# Patient Record
Sex: FEMALE | Race: WHITE
[De-identification: names, ages, dates, MRNs, and addresses within clinical notes are randomized per-mention and may not be internally consistent; named-entity substitution may affect disease eponyms.]

---

## 2016-04-29 VITALS — SYSTOLIC BLOOD PRESSURE: 138 MMHG | DIASTOLIC BLOOD PRESSURE: 89 MMHG

## 2019-09-10 ENCOUNTER — HOSPITAL ENCOUNTER (OUTPATIENT)
Dept: HOSPITAL 61 - KCIC MRI | Age: 64
Discharge: HOME | End: 2019-09-10
Payer: MEDICARE

## 2019-09-10 DIAGNOSIS — M19.011: Primary | ICD-10-CM

## 2019-09-10 PROCEDURE — 73221 MRI JOINT UPR EXTREM W/O DYE: CPT

## 2019-09-11 NOTE — [facility]
EXAM: MRI right shoulder

 

DATE: 9/10/2019 1:15 PM

 

COMPARISON: None

 

INDICATION: Progressing right shoulder pain for one year 

 

TECHNIQUE: Multiplanar, multisequence MRI of the right shoulder was 

performed without contrast.

 

FINDINGS:

AC joint degenerative changes are seen with small inferior projecting 

osteophytes. Type I acromion.

 

Subacromial-subdeltoid bursal distention likely bursitis.

 

There is marked thickening of the central portion of the the supraspinous 

tendon with marked increased signal, just below the threshold for a fluid 

signal. The bursal and articular fibers are visualized and therefore tear 

tear of the supraspinatus is less likely. However tendinosis or contusion 

have similar appearance. In addition, hydroxyapatite deposition disease 

may also have similar characteristics. This can be correlated with 

radiographs and patient's symptoms. Intrasubstance ganglion within the 

infraspinatus extends to the myotendinous junction. Mild subscapularis 

tendinosis.

 

Mild thickening of the intra-articular segment long head biceps tendon 

likely tendinosis. Fissuring of the extra articular long head biceps 

tendon with fluid signal.

 

No discrete labral tear is seen. 

 

Survey evaluation of the articular cartilage, grossly preserved. No 

evidence for fracture or osteonecrosis.

 

IMPRESSION: 

1.  Morphologic and signal changes within the central portion of the 

supraspinatus at the attachment with apparently intact articular and 

bursal fibers. Given these findings contusion or tendinosis is suspected 

anterior is less likely. Additionally intrinsic tendinous process such as 

calcific tendinitis/hydroxyapatite deposition disease may result in 

similar appearance and can be correlated with patient's symptoms and 

radiographs.

2.  Intrasubstance ganglion of the infraspinatus extends to the 

myotendinous junction.

3.  Intra-articular long head biceps tendinosis. Extra articular long head

biceps partial fissuring/incomplete longitudinal tear.

 

Electronically signed by: German Hung MD (9/11/2019 9:22 AM) [facility]

## 2019-10-02 ENCOUNTER — HOSPITAL ENCOUNTER (OUTPATIENT)
Dept: HOSPITAL 63 - DXRAD | Age: 64
Discharge: HOME | End: 2019-10-02
Attending: FAMILY MEDICINE
Payer: MEDICARE
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
## 2019-10-02 DIAGNOSIS — S09.8XXA: Primary | ICD-10-CM
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
## 2019-10-02 PROCEDURE — 70150 X-RAY EXAM OF FACIAL BONES: CPT

## 2019-10-02 PROCEDURE — 70250 X-RAY EXAM OF SKULL: CPT

## 2019-10-02 NOTE — RAD
EXAM: Skull, 2 views; maximal facial bones, 3 views.

 

HISTORY: Trauma.

 

COMPARISON: None.

 

FINDINGS: 2 views of the skull and 3 views of the maximal facial bones are

obtained. No displaced fracture is seen. There is no significant nasal 

septal deviation. There is no paranasal sinus air-fluid level or 

opacification. The frontal sinuses are underpneumatized, a normal variant.

There is dental amalgam and there are multiple missing teeth.

 

IMPRESSION: No acute osseous finding.

 

Electronically signed by: Yeni Lora MD (10/2/2019 2:54 PM) [place] [id number]

## 2019-10-02 NOTE — RAD
EXAM: Skull, 2 views; maximal facial bones, 3 views.

 

HISTORY: Trauma.

 

COMPARISON: None.

 

FINDINGS: 2 views of the skull and 3 views of the maximal facial bones are

obtained. No displaced fracture is seen. There is no significant nasal 

septal deviation. There is no paranasal sinus air-fluid level or 

opacification. The frontal sinuses are underpneumatized, a normal variant.

There is dental amalgam and there are multiple missing teeth.

 

IMPRESSION: No acute osseous finding.

 

Electronically signed by: Yeni Lora MD (10/2/2019 2:54 PM) [place] [id number]

## 2020-07-20 ENCOUNTER — HOSPITAL ENCOUNTER (OUTPATIENT)
Dept: HOSPITAL 63 - DXRAD | Age: 65
Discharge: HOME | End: 2020-07-20
Attending: FAMILY MEDICINE
Payer: MEDICARE

## 2020-07-20 DIAGNOSIS — I51.7: ICD-10-CM

## 2020-07-20 DIAGNOSIS — R04.2: Primary | ICD-10-CM

## 2020-07-20 DIAGNOSIS — J18.9: ICD-10-CM

## 2020-07-20 PROCEDURE — 71046 X-RAY EXAM CHEST 2 VIEWS: CPT

## 2020-07-20 NOTE — RAD
PA and lateral views of the chest. 

 

Comparison:  None.

 

Indication:  Hemoptysis

 

Findings: 

 

The heart size is enlarged. No pneumothorax or effusion. There is focal 

airspace disease in the posterior aspect of the right upper lobe. The bony

structures are intact.

 

Impression: 

1. Posterior segment right upper lobe airspace disease likely blood given 

history of hemoptysis. Aspiration and pneumonia are also considerations.

 

 

Electronically signed by: Armando Garcia MD (7/20/2020 12:00 PM) [id number]

## 2020-07-23 ENCOUNTER — HOSPITAL ENCOUNTER (OUTPATIENT)
Dept: HOSPITAL 63 - LAB | Age: 65
Discharge: HOME | End: 2020-07-23
Attending: FAMILY MEDICINE
Payer: MEDICARE

## 2020-07-23 DIAGNOSIS — R04.2: ICD-10-CM

## 2020-07-23 DIAGNOSIS — J18.9: ICD-10-CM

## 2020-07-23 DIAGNOSIS — Z11.59: Primary | ICD-10-CM

## 2020-07-23 LAB
ALBUMIN SERPL-MCNC: 3.1 G/DL (ref 3.4–5)
ALBUMIN/GLOB SERPL: 0.7 {RATIO} (ref 1–1.7)
ALP SERPL-CCNC: 105 U/L (ref 46–116)
ALT SERPL-CCNC: 19 U/L (ref 14–59)
ANION GAP SERPL CALC-SCNC: 11 MMOL/L (ref 6–14)
AST SERPL-CCNC: 12 U/L (ref 15–37)
BASOPHILS # BLD AUTO: 0 X10^3/UL (ref 0–0.2)
BASOPHILS NFR BLD: 1 % (ref 0–3)
BILIRUB SERPL-MCNC: 0.2 MG/DL (ref 0.2–1)
BUN/CREAT SERPL: 16 (ref 6–20)
CA-I SERPL ISE-MCNC: 23 MG/DL (ref 7–20)
CALCIUM SERPL-MCNC: 9.6 MG/DL (ref 8.5–10.1)
CHLORIDE SERPL-SCNC: 99 MMOL/L (ref 98–107)
CO2 SERPL-SCNC: 26 MMOL/L (ref 21–32)
CREAT SERPL-MCNC: 1.4 MG/DL (ref 0.6–1)
EOSINOPHIL NFR BLD: 0.1 X10^3/UL (ref 0–0.7)
EOSINOPHIL NFR BLD: 1 % (ref 0–3)
ERYTHROCYTE [DISTWIDTH] IN BLOOD BY AUTOMATED COUNT: 13.8 % (ref 11.5–14.5)
GFR SERPLBLD BASED ON 1.73 SQ M-ARVRAT: 37.9 ML/MIN
GLOBULIN SER-MCNC: 4.6 G/DL (ref 2.2–3.8)
GLUCOSE SERPL-MCNC: 196 MG/DL (ref 70–99)
HCT VFR BLD CALC: 33 % (ref 36–47)
HGB BLD-MCNC: 11 G/DL (ref 12–15.5)
LYMPHOCYTES # BLD: 1.7 X10^3/UL (ref 1–4.8)
LYMPHOCYTES NFR BLD AUTO: 16 % (ref 24–48)
MCH RBC QN AUTO: 31 PG (ref 25–35)
MCHC RBC AUTO-ENTMCNC: 34 G/DL (ref 31–37)
MCV RBC AUTO: 92 FL (ref 79–100)
MONO #: 0.9 X10^3/UL (ref 0–1.1)
MONOCYTES NFR BLD: 8 % (ref 0–9)
NEUT #: 7.8 X10^3UL (ref 1.8–7.7)
NEUTROPHILS NFR BLD AUTO: 75 % (ref 31–73)
PLATELET # BLD AUTO: 384 X10^3/UL (ref 140–400)
POTASSIUM SERPL-SCNC: 4.4 MMOL/L (ref 3.5–5.1)
PROT SERPL-MCNC: 7.7 G/DL (ref 6.4–8.2)
RBC # BLD AUTO: 3.58 X10^6/UL (ref 3.5–5.4)
SODIUM SERPL-SCNC: 136 MMOL/L (ref 136–145)
WBC # BLD AUTO: 10.5 X10^3/UL (ref 4–11)

## 2020-07-23 PROCEDURE — 36415 COLL VENOUS BLD VENIPUNCTURE: CPT

## 2020-07-23 PROCEDURE — 85025 COMPLETE CBC W/AUTO DIFF WBC: CPT

## 2020-07-23 PROCEDURE — 71046 X-RAY EXAM CHEST 2 VIEWS: CPT

## 2020-07-23 PROCEDURE — 80053 COMPREHEN METABOLIC PANEL: CPT

## 2020-07-23 NOTE — RAD
CHEST PA   LATERAL

 

History: Reason: PNEUMONIA, HEMOPYTSIS / Spl. Instructions:  / History:  

 

Comparison: 7/20/2020.

 

Findings: 

Frontal and lateral views of the chest were obtained. The 

cardiomediastinal silhouette is normal. Pulmonary vasculature is normal. 

Right upper lobe consolidation along the major fissure is present. This is

similar to the prior exam. No pleural effusion or pneumothorax is seen. 

There is no acute bone abnormality. 

 

IMPRESSION: 

Right upper lobe consolidation along the major fissure again seen. No 

significant change. Follow-up to resolution recommended. No new process.

 

 

Electronically signed by: Adolfo Hardy MD (7/23/2020 3:06 PM) [facility]

## 2020-07-28 ENCOUNTER — HOSPITAL ENCOUNTER (INPATIENT)
Dept: HOSPITAL 61 - ER | Age: 65
LOS: 3 days | Discharge: HOME | DRG: 177 | End: 2020-07-31
Attending: INTERNAL MEDICINE | Admitting: INTERNAL MEDICINE
Payer: MEDICARE

## 2020-07-28 VITALS — BODY MASS INDEX: 39.22 KG/M2 | WEIGHT: 244.05 LBS | HEIGHT: 66 IN

## 2020-07-28 DIAGNOSIS — E66.01: ICD-10-CM

## 2020-07-28 DIAGNOSIS — Z88.2: ICD-10-CM

## 2020-07-28 DIAGNOSIS — E11.9: ICD-10-CM

## 2020-07-28 DIAGNOSIS — I10: ICD-10-CM

## 2020-07-28 DIAGNOSIS — E78.5: ICD-10-CM

## 2020-07-28 DIAGNOSIS — Z82.49: ICD-10-CM

## 2020-07-28 DIAGNOSIS — Z20.828: ICD-10-CM

## 2020-07-28 DIAGNOSIS — J90: ICD-10-CM

## 2020-07-28 DIAGNOSIS — R04.2: ICD-10-CM

## 2020-07-28 DIAGNOSIS — Z96.649: ICD-10-CM

## 2020-07-28 DIAGNOSIS — F17.200: ICD-10-CM

## 2020-07-28 DIAGNOSIS — J96.01: ICD-10-CM

## 2020-07-28 DIAGNOSIS — M19.90: ICD-10-CM

## 2020-07-28 DIAGNOSIS — Z88.8: ICD-10-CM

## 2020-07-28 DIAGNOSIS — J44.0: ICD-10-CM

## 2020-07-28 DIAGNOSIS — F41.9: ICD-10-CM

## 2020-07-28 DIAGNOSIS — J69.0: Primary | ICD-10-CM

## 2020-07-28 LAB
ALBUMIN SERPL-MCNC: 2.7 G/DL (ref 3.4–5)
ALBUMIN/GLOB SERPL: 0.6 {RATIO} (ref 1–1.7)
ALP SERPL-CCNC: 100 U/L (ref 46–116)
ALT SERPL-CCNC: 25 U/L (ref 14–59)
ANION GAP SERPL CALC-SCNC: 8 MMOL/L (ref 6–14)
AST SERPL-CCNC: 16 U/L (ref 15–37)
BASOPHILS # BLD AUTO: 0.1 X10^3/UL (ref 0–0.2)
BASOPHILS NFR BLD: 1 % (ref 0–3)
BILIRUB SERPL-MCNC: 0.1 MG/DL (ref 0.2–1)
BUN SERPL-MCNC: 16 MG/DL (ref 7–20)
BUN/CREAT SERPL: 15 (ref 6–20)
CALCIUM SERPL-MCNC: 9.3 MG/DL (ref 8.5–10.1)
CHLORIDE SERPL-SCNC: 103 MMOL/L (ref 98–107)
CO2 SERPL-SCNC: 28 MMOL/L (ref 21–32)
CREAT SERPL-MCNC: 1.1 MG/DL (ref 0.6–1)
EOSINOPHIL NFR BLD: 0.1 X10^3/UL (ref 0–0.7)
EOSINOPHIL NFR BLD: 1 % (ref 0–3)
ERYTHROCYTE [DISTWIDTH] IN BLOOD BY AUTOMATED COUNT: 13.9 % (ref 11.5–14.5)
GFR SERPLBLD BASED ON 1.73 SQ M-ARVRAT: 50 ML/MIN
GLOBULIN SER-MCNC: 4.4 G/DL (ref 2.2–3.8)
GLUCOSE SERPL-MCNC: 128 MG/DL (ref 70–99)
HCT VFR BLD CALC: 29.9 % (ref 36–47)
HGB BLD-MCNC: 10.5 G/DL (ref 12–15.5)
LYMPHOCYTES # BLD: 2.2 X10^3/UL (ref 1–4.8)
LYMPHOCYTES NFR BLD AUTO: 21 % (ref 24–48)
MAGNESIUM SERPL-MCNC: 1.7 MG/DL (ref 1.8–2.4)
MCH RBC QN AUTO: 32 PG (ref 25–35)
MCHC RBC AUTO-ENTMCNC: 35 G/DL (ref 31–37)
MCV RBC AUTO: 90 FL (ref 79–100)
MONO #: 0.8 X10^3/UL (ref 0–1.1)
MONOCYTES NFR BLD: 8 % (ref 0–9)
NEUT #: 7.1 X10^3/UL (ref 1.8–7.7)
NEUTROPHILS NFR BLD AUTO: 69 % (ref 31–73)
PLATELET # BLD AUTO: 462 X10^3/UL (ref 140–400)
POTASSIUM SERPL-SCNC: 4.6 MMOL/L (ref 3.5–5.1)
PROT SERPL-MCNC: 7.1 G/DL (ref 6.4–8.2)
PROTHROMBIN TIME: 13.2 SEC (ref 11.7–14)
RBC # BLD AUTO: 3.33 X10^6/UL (ref 3.5–5.4)
SODIUM SERPL-SCNC: 139 MMOL/L (ref 136–145)
WBC # BLD AUTO: 10.3 X10^3/UL (ref 4–11)

## 2020-07-28 PROCEDURE — 71275 CT ANGIOGRAPHY CHEST: CPT

## 2020-07-28 PROCEDURE — 93005 ELECTROCARDIOGRAM TRACING: CPT

## 2020-07-28 PROCEDURE — 94640 AIRWAY INHALATION TREATMENT: CPT

## 2020-07-28 PROCEDURE — 88305 TISSUE EXAM BY PATHOLOGIST: CPT

## 2020-07-28 PROCEDURE — 88112 CYTOPATH CELL ENHANCE TECH: CPT

## 2020-07-28 PROCEDURE — 80053 COMPREHEN METABOLIC PANEL: CPT

## 2020-07-28 PROCEDURE — G0378 HOSPITAL OBSERVATION PER HR: HCPCS

## 2020-07-28 PROCEDURE — 31622 DX BRONCHOSCOPE/WASH: CPT

## 2020-07-28 PROCEDURE — 71250 CT THORAX DX C-: CPT

## 2020-07-28 PROCEDURE — 88104 CYTOPATH FL NONGYN SMEARS: CPT

## 2020-07-28 PROCEDURE — 85025 COMPLETE CBC W/AUTO DIFF WBC: CPT

## 2020-07-28 PROCEDURE — 83735 ASSAY OF MAGNESIUM: CPT

## 2020-07-28 PROCEDURE — 84484 ASSAY OF TROPONIN QUANT: CPT

## 2020-07-28 PROCEDURE — 85610 PROTHROMBIN TIME: CPT

## 2020-07-28 PROCEDURE — 96360 HYDRATION IV INFUSION INIT: CPT

## 2020-07-28 PROCEDURE — 83880 ASSAY OF NATRIURETIC PEPTIDE: CPT

## 2020-07-28 PROCEDURE — 99285 EMERGENCY DEPT VISIT HI MDM: CPT

## 2020-07-28 PROCEDURE — 74176 CT ABD & PELVIS W/O CONTRAST: CPT

## 2020-07-28 PROCEDURE — 36415 COLL VENOUS BLD VENIPUNCTURE: CPT

## 2020-07-28 RX ADMIN — BACITRACIN SCH MLS/HR: 5000 INJECTION, POWDER, FOR SOLUTION INTRAMUSCULAR at 15:00

## 2020-07-28 NOTE — PHYS DOC
Past Medical History


Smoking Status:  Current Every Day Smoker





General Adult


EDM:


Chief Complaint:  SHORTNESS OF BREATH





HPI:


HPI:





Patient is a 65-year-old female who presents with a three-week history of 

hemoptysis.  She states she has been coughing and every time she coughs 

something up there is a fair amount of blood in the sputum.  She denies any 

fever chills or sweats.  She does feel achy.  She states she is only smoked 1 

cigarette in the last 5 or 6 days.  Ordinarily, she is a heavy smoker.  Patient 

denies any chest pain.  She denies any unintended weight loss.  []





Review of Systems:


Review of Systems:


Constitutional:   Denies fever or chills. []


Eyes:   Denies change in visual acuity. []


HENT:   Denies nasal congestion or sore throat. [] 


Respiratory:   Per HPI [] 


Cardiovascular:   Denies chest pain or edema. [] 


GI:   Denies abdominal pain, nausea, vomiting, bloody stools or diarrhea. [] 


:  Denies dysuria. [] 


Musculoskeletal:   Denies back pain or joint pain. [] 


Integument:   Denies rash. [] 


Neurologic:   Denies headache, focal weakness or sensory changes. [] 


Endocrine:   Denies polyuria or polydipsia. [] 


Lymphatic:  Denies swollen glands. [] 


Psychiatric:  Denies depression or anxiety. []





Heart Score:


Risk Factors:


Risk Factors:  DM, Current or recent (<one month) smoker, HTN, HLP, family 

history of CAD, obesity.


Risk Scores:


Score 0 - 3:  2.5% MACE over next 6 weeks - Discharge Home


Score 4 - 6:  20.3% MACE over next 6 weeks - Admit for Clinical Observation


Score 7 - 10:  72.7% MACE over next 6 weeks - Early Invasive Strategies





Allergies:


Allergies:





Allergies








Coded Allergies Type Severity Reaction Last Updated Verified


 


  nickel Allergy Intermediate METAL (RASH) 4/26/16 No


 


  tramadol Allergy Intermediate  4/26/16 Yes


 


  sumatriptan Adverse Reaction Severe TACHYCARDIA 4/26/16 Yes











Physical Exam:


PE:





Constitutional: Well developed, well nourished, no acute distress, non-toxic 

appearance. []


HENT: Normocephalic, atraumatic, bilateral external ears normal, oropharynx 

moist, no oral exudates, nose normal. []


Eyes: PERRLA, EOMI, conjunctiva normal, no discharge. [] 


Neck: Normal range of motion, no tenderness, supple, no stridor. [] 


Cardiovascular:Heart rate regular rhythm, no murmur []


Lungs & Thorax:  Bilateral breath sounds clear to auscultation []


Abdomen: Bowel sounds normal, soft, no tenderness, no masses, no pulsatile 

masses. [] 


Skin: Warm, dry, no erythema, no rash. [] 


Back: No tenderness, no CVA tenderness. [] 


Extremities: No tenderness, no cyanosis, no clubbing, ROM intact, no edema. [] 


Neurologic: Alert and oriented X 3, normal motor function, normal sensory 

function, no focal deficits noted. []


Psychologic: Affect normal, judgement normal, mood normal. []





EKG:


EKG:


[]





Radiology/Procedures:


Radiology/Procedures:


[]REASON: hemoptysis


PROCEDURE: CT ANGIOGRAPHY CHEST





Exam: CT of chest with contrast


 


INDICATION: Hemoptysis


 


TECHNIQUE: Sequential axial images through the chest obtained following 


the administration of 90 mL of Isovue-370 IV contrast. Sagittal and 


coronal reformatted images were reconstructed from the axial data and 


reviewed. 3-D reformatted images were reconstructed from the axial data 


and reviewed.


 


Comparisons: None


 


FINDINGS:


Visual is portions of the thyroid are unremarkable. No enlarged 


mediastinal lymph nodes.


 


Heart size is normal. No pericardial effusion. Thoracic aorta has a normal


course and caliber. Pulmonary artery is not enlarged. There is a large 


right suprahilar mass measuring approximately 9.3 x 6.4 cm which 


compresses the right upper lobe pulmonary artery. Mass extends into the 


subcarinal region.


 


Additionally this mass causes compression of the right upper lobe bronchus


and there is consolidative changes in the right upper lobe likely 


postobstructive in etiology. No pneumothorax.


 


There is a small right pleural effusion.


 


Visualized upper abdomen is unremarkable.


 


No suspicious osseous lesions or acute fractures.


 


IMPRESSION:


1.  No pulmonary embolus identified within the main, lobar or segmental 


pulmonary arteries.


2.  Large mass in the right hilar/subcarinal region measuring 


approximately 9.3 x 6.4 cm causing compression of the right upper lobe 


bronchus and pulmonary artery. Findings are favored represent primary lung


malignancy.


3.  Consolidative changes in the right upper lobe favored to represent 


postobstructive pneumonia.





Course & Med Decision Making:


Course & Med Decision Making


Pertinent Labs and Imaging studies reviewed. (See chart for details)





[ED course: Evaluation reveals a 65-year-old female who has been a longtime 

smoker.  She has had hemoptysis recently.  CT scan tonight showed a large lung 

mass that is likely a primary lung cancer.  Given her hemoptysis I felt that it 

was in the patient's best interest to be placed in the hospital in the work-up 

was started.]





Dragon Disclaimer:


Dragon Disclaimer:


This electronic medical record was generated, in whole or in part, using a voice

recognition dictation system.





Departure


Departure


Impression:  


   Primary Impression:  


   Cough with hemoptysis


   Additional Impression:  


   Lung mass


Disposition:  09 ADMITTED AS INPATIENT


Admitting Physician:  HIMS


Condition:  STABLE


Referrals:  


MICHELINE TELLEZ (PCP)





Justicifation of Admission Dx:


Justifications for Admission:


Justification of Admission Dx:  Yes


Comments:


Hemoptysis











ABIMAEL OG DO             Jul 28, 2020 20:34

## 2020-07-28 NOTE — RAD
Exam: CT of chest with contrast

 

INDICATION: Hemoptysis

 

TECHNIQUE: Sequential axial images through the chest obtained following 

the administration of 90 mL of Isovue-370 IV contrast. Sagittal and 

coronal reformatted images were reconstructed from the axial data and 

reviewed. 3-D reformatted images were reconstructed from the axial data 

and reviewed.

 

Comparisons: None

 

FINDINGS:

Visual is portions of the thyroid are unremarkable. No enlarged 

mediastinal lymph nodes.

 

Heart size is normal. No pericardial effusion. Thoracic aorta has a normal

course and caliber. Pulmonary artery is not enlarged. There is a large 

right suprahilar mass measuring approximately 9.3 x 6.4 cm which 

compresses the right upper lobe pulmonary artery. Mass extends into the 

subcarinal region.

 

Additionally this mass causes compression of the right upper lobe bronchus

and there is consolidative changes in the right upper lobe likely 

postobstructive in etiology. No pneumothorax.

 

There is a small right pleural effusion.

 

Visualized upper abdomen is unremarkable.

 

No suspicious osseous lesions or acute fractures.

 

IMPRESSION:

1.  No pulmonary embolus identified within the main, lobar or segmental 

pulmonary arteries.

2.  Large mass in the right hilar/subcarinal region measuring 

approximately 9.3 x 6.4 cm causing compression of the right upper lobe 

bronchus and pulmonary artery. Findings are favored represent primary lung

malignancy.

3.  Consolidative changes in the right upper lobe favored to represent 

postobstructive pneumonia.

 

 

Exposure: One or more of the following in the visualized dose reduction 

techniques were utilized for this examination:

1.  Automated exposure control

2.  Adjustment of the MA and/or KV according to patient size

3.  Use of iterative of reconstructive technique

 

Electronically signed by: Bonita Salazar MD (7/28/2020 10:32 PM) [id number]

## 2020-07-29 VITALS — DIASTOLIC BLOOD PRESSURE: 77 MMHG | SYSTOLIC BLOOD PRESSURE: 155 MMHG

## 2020-07-29 VITALS — DIASTOLIC BLOOD PRESSURE: 86 MMHG | SYSTOLIC BLOOD PRESSURE: 180 MMHG

## 2020-07-29 VITALS — DIASTOLIC BLOOD PRESSURE: 60 MMHG | SYSTOLIC BLOOD PRESSURE: 182 MMHG

## 2020-07-29 VITALS — SYSTOLIC BLOOD PRESSURE: 186 MMHG | DIASTOLIC BLOOD PRESSURE: 89 MMHG

## 2020-07-29 VITALS — DIASTOLIC BLOOD PRESSURE: 97 MMHG | SYSTOLIC BLOOD PRESSURE: 156 MMHG

## 2020-07-29 VITALS — SYSTOLIC BLOOD PRESSURE: 168 MMHG | DIASTOLIC BLOOD PRESSURE: 70 MMHG

## 2020-07-29 VITALS — SYSTOLIC BLOOD PRESSURE: 176 MMHG | DIASTOLIC BLOOD PRESSURE: 69 MMHG

## 2020-07-29 LAB
ALBUMIN SERPL-MCNC: 2.6 G/DL (ref 3.4–5)
ALBUMIN/GLOB SERPL: 0.6 {RATIO} (ref 1–1.7)
ALP SERPL-CCNC: 95 U/L (ref 46–116)
ALT SERPL-CCNC: 22 U/L (ref 14–59)
ANION GAP SERPL CALC-SCNC: 7 MMOL/L (ref 6–14)
AST SERPL-CCNC: 16 U/L (ref 15–37)
BASOPHILS # BLD AUTO: 0 X10^3/UL (ref 0–0.2)
BASOPHILS NFR BLD: 1 % (ref 0–3)
BILIRUB SERPL-MCNC: 0.2 MG/DL (ref 0.2–1)
BUN SERPL-MCNC: 13 MG/DL (ref 7–20)
BUN/CREAT SERPL: 14 (ref 6–20)
CALCIUM SERPL-MCNC: 9.1 MG/DL (ref 8.5–10.1)
CHLORIDE SERPL-SCNC: 103 MMOL/L (ref 98–107)
CO2 SERPL-SCNC: 29 MMOL/L (ref 21–32)
CREAT SERPL-MCNC: 0.9 MG/DL (ref 0.6–1)
EOSINOPHIL NFR BLD: 0.1 X10^3/UL (ref 0–0.7)
EOSINOPHIL NFR BLD: 1 % (ref 0–3)
ERYTHROCYTE [DISTWIDTH] IN BLOOD BY AUTOMATED COUNT: 13.8 % (ref 11.5–14.5)
GFR SERPLBLD BASED ON 1.73 SQ M-ARVRAT: 63 ML/MIN
GLOBULIN SER-MCNC: 4.3 G/DL (ref 2.2–3.8)
GLUCOSE SERPL-MCNC: 140 MG/DL (ref 70–99)
HCT VFR BLD CALC: 30.5 % (ref 36–47)
HGB BLD-MCNC: 10.5 G/DL (ref 12–15.5)
LYMPHOCYTES # BLD: 2 X10^3/UL (ref 1–4.8)
LYMPHOCYTES NFR BLD AUTO: 22 % (ref 24–48)
MCH RBC QN AUTO: 31 PG (ref 25–35)
MCHC RBC AUTO-ENTMCNC: 34 G/DL (ref 31–37)
MCV RBC AUTO: 90 FL (ref 79–100)
MONO #: 0.7 X10^3/UL (ref 0–1.1)
MONOCYTES NFR BLD: 8 % (ref 0–9)
NEUT #: 6 X10^3/UL (ref 1.8–7.7)
NEUTROPHILS NFR BLD AUTO: 68 % (ref 31–73)
PLATELET # BLD AUTO: 461 X10^3/UL (ref 140–400)
POTASSIUM SERPL-SCNC: 4.2 MMOL/L (ref 3.5–5.1)
PROT SERPL-MCNC: 6.9 G/DL (ref 6.4–8.2)
RBC # BLD AUTO: 3.38 X10^6/UL (ref 3.5–5.4)
SODIUM SERPL-SCNC: 139 MMOL/L (ref 136–145)
WBC # BLD AUTO: 8.8 X10^3/UL (ref 4–11)

## 2020-07-29 RX ADMIN — BACLOFEN SCH MG: 10 TABLET ORAL at 20:36

## 2020-07-29 RX ADMIN — BACITRACIN SCH MLS/HR: 5000 INJECTION, POWDER, FOR SOLUTION INTRAMUSCULAR at 00:51

## 2020-07-29 RX ADMIN — ASPIRIN SCH MG: 81 TABLET, COATED ORAL at 15:38

## 2020-07-29 RX ADMIN — FUROSEMIDE SCH MG: 20 TABLET ORAL at 15:39

## 2020-07-29 RX ADMIN — ATORVASTATIN CALCIUM SCH MG: 40 TABLET, FILM COATED ORAL at 20:36

## 2020-07-29 RX ADMIN — CEFTRIAXONE SCH GM: 1 INJECTION, POWDER, FOR SOLUTION INTRAMUSCULAR; INTRAVENOUS at 15:41

## 2020-07-29 RX ADMIN — BACITRACIN SCH MLS/HR: 5000 INJECTION, POWDER, FOR SOLUTION INTRAMUSCULAR at 05:07

## 2020-07-29 RX ADMIN — DIVALPROEX SODIUM SCH MG: 500 TABLET, DELAYED RELEASE ORAL at 20:36

## 2020-07-29 RX ADMIN — NORTRIPTYLINE HYDROCHLORIDE SCH MG: 25 CAPSULE ORAL at 20:36

## 2020-07-29 RX ADMIN — LOSARTAN POTASSIUM SCH MG: 25 TABLET, FILM COATED ORAL at 15:39

## 2020-07-29 RX ADMIN — BACLOFEN SCH MG: 10 TABLET ORAL at 15:38

## 2020-07-29 RX ADMIN — MULTIPLE VITAMINS W/ MINERALS TAB SCH TAB: TAB at 15:39

## 2020-07-29 NOTE — RAD
Exam: CT of abdomen and pelvis without contrast

 

INDICATION: Lung mass

 

TECHNIQUE: Sequential axial images through the abdomen and pelvis obtained

without IV contrast. Sagittal and coronal reformatted images were 

reconstructed from the axial data and reviewed.

 

Comparisons: None

 

FINDINGS:

Heart size is normal. No pericardial effusion. There is a small right 

pleural effusion. Visualized lung bases are clear.

 

Evaluation of solid organs limited secondary to noncontrast technique.

 

Liver, spleen, pancreas and adrenals are unremarkable. Gallbladder is 

nondistended. Contrast is seen within the gallbladder likely from 

vicarious excretion.

 

No perinephric inflammation or hydronephrosis. No renal or ureteral 

calculi are identified.

 

 Pelvis not well evaluated secondary to extensive metallic streak artifact

from bilateral hip arthroplasties.

 

Large and small bowel are unremarkable. Appendix is not identified. No 

free intra-abdominal air or fluid. No obstruction.

 

Abdominal aorta has a normal course and caliber.

 

No enlarged intra-abdominal lymph nodes are identified.

 

No suspicious osseous lesions or acute fractures.

 

IMPRESSION:

1.  No convincing evidence for malignancy identified within the abdomen or

pelvis on noncontrast exam.

2.  Small right pleural effusion.

 

 

Exposure: One or more of the following in the visualized dose reduction 

techniques were utilized for this examination:

1.  Automated exposure control

2.  Adjustment of the MA and/or KV according to patient size

3.  Use of iterative of reconstructive technique

 

Electronically signed by: Bonita Salazar MD (7/29/2020 4:23 PM) [id number]

## 2020-07-29 NOTE — EKG
[facility]

              [address] [place] [address]

Test Date:    2020               Test Time:    21:05:04

Pat Name:     MARY MOJICA              Department:   

Patient ID:   [id number]           Room:          

Gender:       F                        Technician:   

:          [id number]               Requested By: ABIMAEL OG

Order Number: [id number]           Reading MD:     

                                 Measurements

Intervals                              Axis          

Rate:         83                       P:            59

ME:           186                      QRS:          -14

QRSD:         98                       T:            39

QT:           372                                    

QTc:          443                                    

                           Interpretive Statements

SINUS RHYTHM

LEFTWARD AXIS

QRS(T) CONTOUR ABNORMALITY

CONSIDER ANTEROSEPTAL MYOCARDIAL DAMAGE

POSSIBLY ABNORMAL ECG

RI6.01

No previous ECG available for comparison

## 2020-07-29 NOTE — HP
ADMIT DATE:  



CHIEF COMPLAINT:  Hemoptysis and shortness of breath.



HISTORY OF PRESENT ILLNESS:  The patient is a pleasant 65-year-old female who

states she quit smoking 3 weeks ago, but then she had a cigarette on the way to

the hospital.  She came to the hospital because she has been coughing up blood,

it is a fair amount of blood, rated at 6/10.  She has associated anxiety.  I

discussed the case with ER physician.  We are going to admit the patient and

consult Pulmonary Medicine.  It should be noted that she also has a lung mass on

her chest x-ray done in the ER.  It is a large mass about 9.3 x 6.4 causing

compression of the right bronchus.  Findings are favoring primary lung cancer.



PAST MEDICAL HISTORY:  Heavy tobacco abuse, hypertension, hyperlipidemia,

angina, seizures, edema, diabetes.



ALLERGIES:  SUMATRIPTAN, ULTRAM, and NICKEL.



FAMILY HISTORY:  Coronary disease.



SOCIAL HISTORY:  She smoked until 3 weeks ago, although she smoked a cigar on

the way here, does not drink or take drugs.  She is retired.



MEDICATIONS:  Reviewed, please refer to the MRAD.



REVIEW OF SYSTEMS:

GENERAL:  No history of weight change, weakness or fevers.

SKIN:  No bruising, hair changes or rashes.

EYES:  No blurred, double or loss of vision.

NOSE AND THROAT:  No history of nosebleeds, hoarseness or sore throat.

HEART:  No history of palpitations, chest pain.

LUNGS:  She complains of shortness of breath and cough and hemoptysis..

GASTROINTESTINAL:  Denies changes in appetite, nausea, vomiting, diarrhea or

constipation.

GENITOURINARY:  No history of frequency, urgency, hesitancy or nocturia.

NEUROLOGIC:  Denies history of numbness, tingling, tremor or weakness.

PSYCHIATRIC:  No history of panic, anxiety or depression.

ENDOCRINE:  No history of heat or cold intolerance, polyuria or polydipsia.

EXTREMITIES:  Denies muscle weakness, joint pain, pain on walking or stiffness.

 

PHYSICAL EXAMINATION:

VITALS:  Within normal limits and are stable.

GENERAL:  No apparent distress.  Alert and oriented.

HEENT:  Normal cephalic atraumatic, external auditory canals are patent

EYES:  Extraocular muscles are intact, pupils are equally round and reactive to

light and accommodation

MUSCULOSKELETAL:  Well developed, well nourished, good range of motion

ENDOCRINE:  No thyromegaly was palpated.

LYMPHATICS:  No cervical chain or axillary nodes were noted

HEMATOPOIETIC:  No bruising

NECK:  Supple, no JVD, no thyromegaly was noted.

LUNGS:  Clear to auscultation in all lung fields without rhonchi or wheezing.

HEART:  RRR, S1, S2 present.  Peripheral pulses intact, no obvious murmurs were

noted.

ABDOMEN:  Soft, nontender.  Positive bowel sounds no organomegaly, normal bowel

sounds.

EXTREMITIES:  Without any cyanosis, clubbing, or edema.  Pedal pulses intact,

Homans sign is negative.

NEUROLOGIC:  Normal speech, normal tone.  A & O x3, moves all extremities, no

obvious focal deficits.

PSYCHIATRIC:  Normal affect, normal mood.  Stable.

SKIN:  No ulcerations or rashes, good skin turgor, no jaundice.

VASCULAR:  Good capillary refill, neurovascular bundle appears to be intact.



LABORATORY DATA:  White count 10, hemoglobin 10, platelets 462.  Electrolytes

are normal.  



CT of the chest shows a large pulmonary mass 9.3 x 6.4.



ASSESSMENT AND PLAN:  Probable lung cancer.  The patient has been admitted.  We

will consult Pulmonary Medicine.  Suspect we will need to get a biopsy to

confirm this.  Consult Hematology/Oncology, home meds, DVT prophylaxis, full

code, DuoNeb.



PROGNOSIS:  Extremely guarded.

 



______________________________

BENITO HARMON DO



DR:  SHELBY/megan  JOB#:  [id number] / [id number]

DD:  07/29/2020 09:05  DT:  07/29/2020 09:31

## 2020-07-29 NOTE — CONS
DATE OF CONSULTATION:  



PULMONARY CONSULTATION



ATTENDING PHYSICIAN:  Víctor Dyer MD.



REASON FOR CONSULTATION:  Hemoptysis, lung mass.



HISTORY OF PRESENT ILLNESS:  The patient is a 65-year-old obese female with a

BMI of 40.9.  The patient has been having hemoptysis for 3 weeks.  She saw her

primary care doctor who called me that despite antibiotic there is no

improvement with hemoptysis.  As a result, I advised him to come to the

Emergency Room and get a CAT scan of the chest.  The patient has smoked for

about 50 years.  She has not completely quit tobacco.  She has never been tested

for sleep apnea.  She has no history of deep vein thrombosis or pulmonary

embolism.  She denies any weight loss.



Imaging study was performed and CTA chest was reviewed by me.  The patient has a

large right suprahilar mass measuring about 9.3 cm in size.  There is

compression of the right upper lobe bronchus.  There is also compression of the

pulmonary artery on the right side.  There is postobstructive consolidation

versus a mass in the right upper lobe.  I have been asked to see her for further

evaluation.  No suspicious lesion or acute fractures were seen.  No abdominal

pathology reported.  She is currently requiring oxygen at 2 liters.



PAST MEDICAL HISTORY:  Significant for,

1.  History of tobacco use, suspected underlying COPD.

2.  History of morbid obesity and suspected DOT.

3.  She has hypertension, hyperlipidemia, seizures, and diabetes.



PAST SURGICAL HISTORY:  No recent surgeries.



FAMILY HISTORY:  Coronary artery disease.



ALLERGIES:  SUMATRIPTAN, ULTRAM, AND NICKEL.



SOCIAL HISTORY:  Smoked since age 17.  Smoked for at least 50 years.



MEDICATIONS:  Reviewed as listed in the MRAD.



REVIEW OF SYSTEMS:  Twelve-point system obtained.  Pertinent positives discussed

in my history of present illness, otherwise noncontributory.  All systems that

were negative were reviewed as well.



PHYSICAL EXAMINATION:

VITAL SIGNS:  Reviewed.  Blood pressure on the high side.  Pulse ox 97% on 2

liters, afebrile.

HEENT:  Sclerae nonicteric.

NECK:  Supple.

LUNGS:  With diminished breath sounds in right lung.

CARDIOVASCULAR:  With a regular rate.

ABDOMEN:  Soft, obese.

EXTREMITIES:  With no pitting edema.



LABORATORY DATA:  Reviewed.  White cell count 8.8, hemoglobin 10.5, and

platelets are 461.  INR 1.0.  BUN 13, creatinine 0.9.  Albumin is 2.6.



IMPRESSION:

1.  Acute hypoxic respiratory failure secondary to underlying chronic

obstructive pulmonary disease, obesity and highly suspected primary lung

malignancy.

2.  Hemoptysis secondary to suspected endobronchial lesion.

3.  Abnormal CT chest with a large suprahilar mass 9.3 cm in size causing

compression of the right upper lobe bronchus and also endobronchial lesion is

suspected.  She has a postobstructive consolidation in the right upper lobe,

which could be combination of blood and pneumonia.



RECOMMENDATIONS:

1.  Discussed with the patient and RN.  I explained to her the CT chest

findings.  She would be a candidate for bronchoscopy.  COVID testing is pending

and once she is ruled out, we will pursue with bronchoscopy.  She understands

the risk.  She also understands that post-biopsy, there could be bleeding, which

in a small percentage of cases, may result in respiratory failure requiring

mechanical ventilation.  She agrees to proceed with the procedure.

2.  We will do CT abdomen and pelvis to rule out any distant metastasis.

3.  Continue present oxygen.

4.  Add empiric antibiotic.

5.  Weight loss is advised.

6.  Medical-Oncology and Radiation-Oncology consultation.  Based on the CT

chest, she is not a surgical candidate.

7.  PFTs as an outpatient.

8.  Discussed with RN and we will follow along with you.

 



______________________________

BUBBA WRAY MD DR:  JA/megan  JOB#:  [id number] / [id number]

DD:  07/29/2020 11:27  DT:  07/29/2020 11:44

## 2020-07-29 NOTE — PDOC2
CONSULT


Date of Consult


Date of Consult


DATE: 7/29/20 


TIME: 16:47





Reason for Consult


Reason for Consult:


Lung mass





Referring Physician


Referring Physician:


Dr Garcia





Identification/Chief Complaint


Chief Complaint


Hemoptysis


Problems:  


(1) Lung mass


(2) Cough with hemoptysis





Source


Source:  Chart review, Patient





History of Present Illness


Reason for Visit:


Ms Melchor is a 65 year old female with hx of tobacco use who has been admitted 

for further evaluation and management of hemoptysis. Patient reprots that she 

has noticed bloody content in her sputum for several days. Following her 

admission to the hospital, she  received a CT chest which showed a large right 

hilar mass with post-obstructive pneumonia. She endorses shortness of breath. 

She denies fever, chills, or chest pain. She reports a hx of DM2 but has no hx 

of HTN, CAD or CVA. She has been seen by Dr Montero in consultation and 

bronchoscopy has been planned for sampling of the lung mass.





Oncology has been consulted to assist with work-up of lung mass.





Past Medical History


Cardiovascular:  HTN


Musculoskeletal:  Osteoarthritis


Endocrine:  Diabetes





Past Surgical History


Past Surgical History:  Total hip replacement





Family History


Family History:  No Significant





Social History


ALCOHOL:  none


Domestic Violence:  Neg





Current Problem List


Problem List


Problems


Medical Problems:


(1) Cough with hemoptysis


Status: Acute  





(2) Lung mass


Status: Acute  











Current Medications


Current Medications





Current Medications


Sodium Chloride 500 ml @  500 mls/hr 1X  ONCE IV  Last administered on 7/28/20at

21:07;  Start 7/28/20 at 20:30;  Stop 7/28/20 at 21:29;  Status DC


Iohexol (Omnipaque 350 Mg/ml) 100 ml [employer] ONCE .ROUTE ;  Start 7/28/20 at 

22:00;  Stop 7/28/20 at 22:00;  Status DC


Ondansetron HCl (Zofran) 4 mg PRN Q8HRS  PRN IV NAUSEA/VOMITING;  Start 7/28/20 

at 23:00;  Stop 7/29/20 at 22:59


Sodium Chloride 1,000 ml @  125 mls/hr Q8H IV  Last administered on 7/29/20at 

00:51;  Start 7/28/20 at 23:00;  Stop 7/29/20 at 22:59


Ceftriaxone Sodium (Rocephin) 1 gm Q24H IVP  Last administered on 7/29/20at 

15:41;  Start 7/29/20 at 13:00


Aspirin (Ecotrin) 81 mg DAILY PO  Last administered on 7/29/20at 15:38;  Start 

7/29/20 at 13:30


Atorvastatin Calcium (Lipitor) 40 mg HS PO ;  Start 7/29/20 at 21:00


Divalproex Sodium (Depakote) 1,000 mg HS PO ;  Start 7/29/20 at 21:00


Furosemide (Lasix) 20 mg DAILY PO  Last administered on 7/29/20at 15:39;  Start 

7/29/20 at 13:30


Losartan Potassium (Cozaar) 25 mg DAILY PO  Last administered on 7/29/20at 

15:39;  Start 7/29/20 at 13:30


Diltiazem HCl (Cardizem 24hr Cd) 180 mg BID PO ;  Start 7/29/20 at 21:00


Metformin HCl (Glucophage) 1,000 mg BIDWMEALS PO ;  Start 7/29/20 at 17:00


Multivitamins (Thera M Plus) 1 tab DAILY PO  Last administered on 7/29/20at 

15:39;  Start 7/29/20 at 13:30


Nortriptyline HCl (Pamelor) 100 mg QHS PO ;  Start 7/29/20 at 21:00


Baclofen (Lioresal) 20 mg TID PO  Last administered on 7/29/20at 15:38;  Start 

7/29/20 at 14:00





Active Scripts


Active


Reported


Augmentin 875-125 Tablet (Amoxicillin/Potassium Clav) 1 Each Tablet 1 Tab PO BID

4 Days


Baclofen 20 Mg Tablet 20 Mg PO TID


Metronidazole 500 Mg Tablet 1 Tab PO BID 7 Days


Aspir-Low (Aspirin) 81 Mg Tablet.dr 81 Mg PO DAILY


     LAST DOSE GIVEN:


     DATE: 4/29


     TIME: 9 am


     NEXT DOSE DUE:


     DATE: 4/30


     TIME: 9 am


Nitrostat (Nitroglycerin) 0.4 Mg Tab.subl 0.4 Mg SL  PRN


     Take as directed as needed for chest pain.


Losartan Potassium ** (Losartan Potassium) 25 Mg Tablet 25 Mg PO DAILY


     LAST DOSE GIVEN:


     DATE: 4/27


     TIME: 9 am


     NEXT DOSE DUE:


     DATE: 4/30


     TIME: 9 am


Atorvastatin Calcium 40 Mg Tablet 40 Mg PO HS


     LAST DOSE GIVEN:


     DATE: 4/28


     TIME: 9 pm


     NEXT DOSE DUE:


     DATE: 4/29


     TIME: 9 pm


Nortriptyline Hcl 50 Mg Capsule 100 Mg PO HS


     LAST DOSE GIVEN:


     DATE: 4/28


     TIME: 9 pm


     NEXT DOSE DUE:


     DATE: 4/29


     TIME: 9 pm


Depakote (Divalproex Sodium) 500 Mg Tablet.dr 1,000 Mg PO HS


     LAST DOSE GIVEN:


     DATE: 4/28


     TIME: 9 pm


     NEXT DOSE DUE:


     DATE: 4/29


     TIME: 9 pm


Metformin Hcl 1,000 Mg Tablet 1,000 Mg PO BID


     LAST DOSE GIVEN:


     DATE: 4/29


     TIME: 8 am


     NEXT DOSE DUE:


     DATE: 4/29


     TIME: 5 pm


Diltiazem Hcl Tablet (Diltiazem Hcl) 120 Mg Tablet 180 Mg PO BID


     LAST DOSE GIVEN:


     DATE: 4/29


     TIME: 9 am


     NEXT DOSE DUE:


     DATE: 4/29


     TIME: 9 am


Furosemide 20 Mg Tablet 20 Mg PO DAILY


     LAST DOSE GIVEN:


     DATE: 4/28


     TIME: 9 am


     NEXT DOSE DUE:


     DATE: 4/30


     TIME: 9 am


Multiple Vitamin (Multivitamin With Minerals) 1 Each Tablet 1 Each PO DAILY


     LAST DOSE GIVEN:


     DATE: 4/29


     TIME: 9 am


     NEXT DOSE DUE:


     DATE: 4/30


     TIME: 9 am





Allergies


Allergies:  


Coded Allergies:  


     nickel (Unverified  Allergy, Intermediate, METAL (RASH), 4/26/16)


     tramadol (Verified  Allergy, Intermediate, 4/26/16)


   


   MAKES HER FEEL "HIGH"


     sumatriptan (Verified  Adverse Reaction, Severe, TACHYCARDIA, 4/26/16)





ROS


General:  No: Chills, Night Sweats


PSYCHOLOGICAL ROS:  No: Anxiety


Eyes:  No Blurry vision, No Decreased vision


HEENT:  No: Heacaches, Visual Changes


ALLERGY AND IMMUNOLOGY:  No: Hives, Nasal Congestion


Hematological and Lymphatic:  No: Bleeding Problems, Blood Clots, Blood 

Transfusions


ENDOCRINE:  No: Breast Changes


Respiratory:  YES: Cough, Hemoptysis, Pleuritic Pain, Shortness of breath; 


   No: Orthopnea


Cardiovascular:  No Chest Pain, No Edema


Gastrointestinal:  No Nausea, No Vomiting


Genitourinary:  No Dysuria, No Flank Pain


Musculoskeletal:  No Gait Disturbance, No Joint Pain


Neurological:  No Behavorial Changes


Skin:  No Dry Skin





Physical Exam


General:  Alert, Oriented X3


HEENT:  Atraumatic


Lungs:  Clear to auscultation, Other (Crackles heard on the right side, 

decreased air entry in the lung bases)


Heart:  Regular rate, Normal S1


Abdomen:  Normal bowel sounds


Extremities:  No clubbing


Skin:  No rashes


Neuro:  Normal gait, Normal tone


MUSCULOSKELETAL:  No swelling





Vitals


VITALS





Vital Signs








  Date Time  Temp Pulse Resp B/P (MAP) Pulse Ox O2 Delivery O2 Flow Rate FiO2


 


7/29/20 15:39  94  182/60    


 


7/29/20 15:20 98.2  18  93 Nasal Cannula 2.0 





 98.2       











Labs


Labs





Laboratory Tests








Test


 7/28/20


20:50 7/29/20


04:45


 


White Blood Count


 10.3 x10^3/uL


(4.0-11.0) 8.8 x10^3/uL


(4.0-11.0)


 


Red Blood Count


 3.33 x10^6/uL


(3.50-5.40) 3.38 x10^6/uL


(3.50-5.40)


 


Hemoglobin


 10.5 g/dL


(12.0-15.5) 10.5 g/dL


(12.0-15.5)


 


Hematocrit


 29.9 %


(36.0-47.0) 30.5 %


(36.0-47.0)


 


Mean Corpuscular Volume 90 fL ()  90 fL () 


 


Mean Corpuscular Hemoglobin 32 pg (25-35)  31 pg (25-35) 


 


Mean Corpuscular Hemoglobin


Concent 35 g/dL


(31-37) 34 g/dL


(31-37)


 


Red Cell Distribution Width


 13.9 %


(11.5-14.5) 13.8 %


(11.5-14.5)


 


Platelet Count


 462 x10^3/uL


(140-400) 461 x10^3/uL


(140-400)


 


Neutrophils (%) (Auto) 69 % (31-73)  68 % (31-73) 


 


Lymphocytes (%) (Auto) 21 % (24-48)  22 % (24-48) 


 


Monocytes (%) (Auto) 8 % (0-9)  8 % (0-9) 


 


Eosinophils (%) (Auto) 1 % (0-3)  1 % (0-3) 


 


Basophils (%) (Auto) 1 % (0-3)  1 % (0-3) 


 


Neutrophils # (Auto)


 7.1 x10^3/uL


(1.8-7.7) 6.0 x10^3/uL


(1.8-7.7)


 


Lymphocytes # (Auto)


 2.2 x10^3/uL


(1.0-4.8) 2.0 x10^3/uL


(1.0-4.8)


 


Monocytes # (Auto)


 0.8 x10^3/uL


(0.0-1.1) 0.7 x10^3/uL


(0.0-1.1)


 


Eosinophils # (Auto)


 0.1 x10^3/uL


(0.0-0.7) 0.1 x10^3/uL


(0.0-0.7)


 


Basophils # (Auto)


 0.1 x10^3/uL


(0.0-0.2) 0.0 x10^3/uL


(0.0-0.2)


 


Prothrombin Time


 13.2 SEC


(11.7-14.0) 





 


Prothromb Time International


Ratio 1.0 (0.8-1.1) 


 





 


Sodium Level


 139 mmol/L


(136-145) 139 mmol/L


(136-145)


 


Potassium Level


 4.6 mmol/L


(3.5-5.1) 4.2 mmol/L


(3.5-5.1)


 


Chloride Level


 103 mmol/L


() 103 mmol/L


()


 


Carbon Dioxide Level


 28 mmol/L


(21-32) 29 mmol/L


(21-32)


 


Anion Gap 8 (6-14)  7 (6-14) 


 


Blood Urea Nitrogen


 16 mg/dL


(7-20) 13 mg/dL


(7-20)


 


Creatinine


 1.1 mg/dL


(0.6-1.0) 0.9 mg/dL


(0.6-1.0)


 


Estimated GFR


(Cockcroft-Gault) 50.0 


 63.0 





 


BUN/Creatinine Ratio 15 (6-20)  14 (6-20) 


 


Glucose Level


 128 mg/dL


(70-99) 140 mg/dL


(70-99)


 


Calcium Level


 9.3 mg/dL


(8.5-10.1) 9.1 mg/dL


(8.5-10.1)


 


Magnesium Level


 1.7 mg/dL


(1.8-2.4) 





 


Total Bilirubin


 0.1 mg/dL


(0.2-1.0) 0.2 mg/dL


(0.2-1.0)


 


Aspartate Amino Transf


(AST/SGOT) 16 U/L (15-37) 


 16 U/L (15-37) 





 


Alanine Aminotransferase


(ALT/SGPT) 25 U/L (14-59) 


 22 U/L (14-59) 





 


Alkaline Phosphatase


 100 U/L


() 95 U/L


()


 


Troponin I Quantitative


 < 0.017 ng/mL


(0.000-0.055) 





 


NT-Pro-B-Type Natriuretic


Peptide 216 pg/mL


(0-124) 





 


Total Protein


 7.1 g/dL


(6.4-8.2) 6.9 g/dL


(6.4-8.2)


 


Albumin


 2.7 g/dL


(3.4-5.0) 2.6 g/dL


(3.4-5.0)


 


Albumin/Globulin Ratio 0.6 (1.0-1.7)  0.6 (1.0-1.7) 








Laboratory Tests








Test


 7/28/20


20:50 7/29/20


04:45


 


White Blood Count


 10.3 x10^3/uL


(4.0-11.0) 8.8 x10^3/uL


(4.0-11.0)


 


Red Blood Count


 3.33 x10^6/uL


(3.50-5.40) 3.38 x10^6/uL


(3.50-5.40)


 


Hemoglobin


 10.5 g/dL


(12.0-15.5) 10.5 g/dL


(12.0-15.5)


 


Hematocrit


 29.9 %


(36.0-47.0) 30.5 %


(36.0-47.0)


 


Mean Corpuscular Volume 90 fL ()  90 fL () 


 


Mean Corpuscular Hemoglobin 32 pg (25-35)  31 pg (25-35) 


 


Mean Corpuscular Hemoglobin


Concent 35 g/dL


(31-37) 34 g/dL


(31-37)


 


Red Cell Distribution Width


 13.9 %


(11.5-14.5) 13.8 %


(11.5-14.5)


 


Platelet Count


 462 x10^3/uL


(140-400) 461 x10^3/uL


(140-400)


 


Neutrophils (%) (Auto) 69 % (31-73)  68 % (31-73) 


 


Lymphocytes (%) (Auto) 21 % (24-48)  22 % (24-48) 


 


Monocytes (%) (Auto) 8 % (0-9)  8 % (0-9) 


 


Eosinophils (%) (Auto) 1 % (0-3)  1 % (0-3) 


 


Basophils (%) (Auto) 1 % (0-3)  1 % (0-3) 


 


Neutrophils # (Auto)


 7.1 x10^3/uL


(1.8-7.7) 6.0 x10^3/uL


(1.8-7.7)


 


Lymphocytes # (Auto)


 2.2 x10^3/uL


(1.0-4.8) 2.0 x10^3/uL


(1.0-4.8)


 


Monocytes # (Auto)


 0.8 x10^3/uL


(0.0-1.1) 0.7 x10^3/uL


(0.0-1.1)


 


Eosinophils # (Auto)


 0.1 x10^3/uL


(0.0-0.7) 0.1 x10^3/uL


(0.0-0.7)


 


Basophils # (Auto)


 0.1 x10^3/uL


(0.0-0.2) 0.0 x10^3/uL


(0.0-0.2)


 


Prothrombin Time


 13.2 SEC


(11.7-14.0) 





 


Prothromb Time International


Ratio 1.0 (0.8-1.1) 


 





 


Sodium Level


 139 mmol/L


(136-145) 139 mmol/L


(136-145)


 


Potassium Level


 4.6 mmol/L


(3.5-5.1) 4.2 mmol/L


(3.5-5.1)


 


Chloride Level


 103 mmol/L


() 103 mmol/L


()


 


Carbon Dioxide Level


 28 mmol/L


(21-32) 29 mmol/L


(21-32)


 


Anion Gap 8 (6-14)  7 (6-14) 


 


Blood Urea Nitrogen


 16 mg/dL


(7-20) 13 mg/dL


(7-20)


 


Creatinine


 1.1 mg/dL


(0.6-1.0) 0.9 mg/dL


(0.6-1.0)


 


Estimated GFR


(Cockcroft-Gault) 50.0 


 63.0 





 


BUN/Creatinine Ratio 15 (6-20)  14 (6-20) 


 


Glucose Level


 128 mg/dL


(70-99) 140 mg/dL


(70-99)


 


Calcium Level


 9.3 mg/dL


(8.5-10.1) 9.1 mg/dL


(8.5-10.1)


 


Magnesium Level


 1.7 mg/dL


(1.8-2.4) 





 


Total Bilirubin


 0.1 mg/dL


(0.2-1.0) 0.2 mg/dL


(0.2-1.0)


 


Aspartate Amino Transf


(AST/SGOT) 16 U/L (15-37) 


 16 U/L (15-37) 





 


Alanine Aminotransferase


(ALT/SGPT) 25 U/L (14-59) 


 22 U/L (14-59) 





 


Alkaline Phosphatase


 100 U/L


() 95 U/L


()


 


Troponin I Quantitative


 < 0.017 ng/mL


(0.000-0.055) 





 


NT-Pro-B-Type Natriuretic


Peptide 216 pg/mL


(0-124) 





 


Total Protein


 7.1 g/dL


(6.4-8.2) 6.9 g/dL


(6.4-8.2)


 


Albumin


 2.7 g/dL


(3.4-5.0) 2.6 g/dL


(3.4-5.0)


 


Albumin/Globulin Ratio 0.6 (1.0-1.7)  0.6 (1.0-1.7) 











Images


Images


Reviewed CT chest results and personally reviewed images





Assessment/Plan


Assessment/Plan


Assessment:


Lung mass


Right pleural effusion


Hemoptysis


Tobacco use


Hypertension





Recommendations:


Agree with bronchoscopy for sampling of lung mass


Agree with CT abdomen for evaluation of distant metastasis


Recommend thoracentesis of right-sided pleural effusion, if feasible for staging

purposes


Unlikely to be surgically resectable based on appearance on CT


Plan PET and MRI brain inpatient/outpatient after histologic diagnosis is 

obtained to complete staging





Thank you for the consult





Marshal Romero MD


Hem-Onc


Ph: [phone]











THEODORA ROMERO MD       Jul 29, 2020 17:05

## 2020-07-29 NOTE — NUR
SW following. Spoke with RN and reviewed chart. SW attempted to call into pt's room to 
coordinate care. Pt on 2l 02 and IV Rocephin. Pt COVID pending. Pt is being followed by 
pulmonary and there is also a consult per her lung mass. SW to continue following.

## 2020-07-30 VITALS — SYSTOLIC BLOOD PRESSURE: 169 MMHG | DIASTOLIC BLOOD PRESSURE: 101 MMHG

## 2020-07-30 VITALS — DIASTOLIC BLOOD PRESSURE: 86 MMHG | SYSTOLIC BLOOD PRESSURE: 146 MMHG

## 2020-07-30 VITALS — SYSTOLIC BLOOD PRESSURE: 131 MMHG | DIASTOLIC BLOOD PRESSURE: 84 MMHG

## 2020-07-30 VITALS — DIASTOLIC BLOOD PRESSURE: 71 MMHG | SYSTOLIC BLOOD PRESSURE: 162 MMHG

## 2020-07-30 VITALS — SYSTOLIC BLOOD PRESSURE: 163 MMHG | DIASTOLIC BLOOD PRESSURE: 93 MMHG

## 2020-07-30 LAB — PROTHROMBIN TIME: 13.7 SEC (ref 11.7–14)

## 2020-07-30 PROCEDURE — 0BB58ZX EXCISION OF RIGHT MIDDLE LOBE BRONCHUS, VIA NATURAL OR ARTIFICIAL OPENING ENDOSCOPIC, DIAGNOSTIC: ICD-10-PCS | Performed by: INTERNAL MEDICINE

## 2020-07-30 RX ADMIN — FUROSEMIDE SCH MG: 20 TABLET ORAL at 08:25

## 2020-07-30 RX ADMIN — BACLOFEN SCH MG: 10 TABLET ORAL at 08:25

## 2020-07-30 RX ADMIN — BACITRACIN SCH MLS/HR: 5000 INJECTION, POWDER, FOR SOLUTION INTRAMUSCULAR at 12:30

## 2020-07-30 RX ADMIN — MULTIPLE VITAMINS W/ MINERALS TAB SCH TAB: TAB at 08:26

## 2020-07-30 RX ADMIN — BACLOFEN SCH MG: 10 TABLET ORAL at 14:19

## 2020-07-30 RX ADMIN — DIVALPROEX SODIUM SCH MG: 500 TABLET, DELAYED RELEASE ORAL at 21:25

## 2020-07-30 RX ADMIN — ATORVASTATIN CALCIUM SCH MG: 40 TABLET, FILM COATED ORAL at 21:24

## 2020-07-30 RX ADMIN — ASPIRIN SCH MG: 81 TABLET, COATED ORAL at 08:25

## 2020-07-30 RX ADMIN — NORTRIPTYLINE HYDROCHLORIDE SCH MG: 25 CAPSULE ORAL at 21:24

## 2020-07-30 RX ADMIN — SODIUM CHLORIDE, SODIUM LACTATE, POTASSIUM CHLORIDE, AND CALCIUM CHLORIDE SCH MLS/HR: .6; .31; .03; .02 INJECTION, SOLUTION INTRAVENOUS at 10:34

## 2020-07-30 RX ADMIN — BACLOFEN SCH MG: 10 TABLET ORAL at 21:24

## 2020-07-30 RX ADMIN — SODIUM CHLORIDE, SODIUM LACTATE, POTASSIUM CHLORIDE, AND CALCIUM CHLORIDE SCH MLS/HR: .6; .31; .03; .02 INJECTION, SOLUTION INTRAVENOUS at 21:23

## 2020-07-30 RX ADMIN — LOSARTAN POTASSIUM SCH MG: 25 TABLET, FILM COATED ORAL at 08:25

## 2020-07-30 RX ADMIN — CEFTRIAXONE SCH GM: 1 INJECTION, POWDER, FOR SOLUTION INTRAMUSCULAR; INTRAVENOUS at 14:02

## 2020-07-30 NOTE — OP
DATE OF SURGERY:  07/30/2020



PROCEDURE:  Bronchoscopy.



INDICATIONS:  Lung mass and hemoptysis.



DESCRIPTION OF PROCEDURE:  Informed consent was obtained from the patient.  All

risks and benefits were explained.  She agreed to proceed with the procedure. 

Risks including worsening bleeding and with respiratory failure.



Propofol was used by Anesthesia for sedation.



Bronchoscope was introduced through the left nostril.  The upper airway was

passed.  Vocal cord moves equally with respiration.  Trachea was entered.  No

tracheal lesions seen.  Gricelda was sharp.  Upon inspection of the right lung,

especially the right upper lobe, there was oozing of blood seen distally from

the apical subsegment of the right upper lobe.  I could not visualize any

obvious endobronchial lesion.  It could be distally present, but the scope was

unable to reach that area despite maximum angulation.  Blind cytology brush was

obtained from that area.  Bronch was then introduced at the junction of right

middle lobe and right lower lobe.  The subcarina was wide.  There was narrowing

of the opening of the right middle lobe and there was submucosal tumor. 

Biopsies x 3 were performed from this area and cytology brush was performed from

this area.  There was oozing of blood noticed, which was controlled with squirt

of epinephrine.  The right lower lobe appeared patent.  The left lung was

examined.  All subsegments of left upper lobe, lingula and left lower lobe were

examined.  No endobronchial lesions seen.  No mucosal abnormality seen.



IMPRESSION:

1.  Significant narrowing of the opening of the right middle lobe bronchus with

widening of the subcarina between right middle lobe and right lower lobe.  There

was mucosal tumor seen at the opening of the right middle lobe.  Biopsies x 3

along with cytology brush x 2 was performed from this area.

2.  Oozing of the blood noticed from the apical subsegment of the right upper

lobe very distally.  Despite maximum angulation with the bronchoscope, I could

not visualize any definite endobronchial lesion, which may be present distally. 

A blind cytology brush was obtained from this area.

3.  The patient tolerated the procedure well.  We will follow the results of the

biopsy.

 



______________________________

UBBBA WRAY MD



DR:  JA/megan  JOB#:  [id number] / [id number]

DD:  07/30/2020 11:23  DT:  07/30/2020 11:43

## 2020-07-30 NOTE — PDOC
[facility] PROGRESS NOTE


Chief Complaint


Chief Complaint


Acute hypoxic respiratory failure secondary to underlying COPD, obesity 


Hemoptysis secondary to suspected endobronchial lesion.


Primary lung malignancy - large suprahilar mass 9.3 cm in size causing 

compression of the right upper lobe bronchus 


Endobronchial lesion


Postobstructive consolidation in the right upper lobe,


Right pleural effusion


Tobacco use


Hypertension





History of Present Illness


History of Present Illness


7/30/2020


Patient was seen and examined


Charts reviewed


Discussed with RN





HISTORY OF PRESENT ILLNESS:  The patient is a pleasant 65-year-old female who


states she quit smoking 3 weeks ago, but then she had a cigarette on the way to


the hospital.  She came to the hospital because she has been coughing up blood,


it is a fair amount of blood, rated at 6/10.  She has associated anxiety.  I


discussed the case with ER physician.  We are going to admit the patient and


consult Pulmonary Medicine.  It should be noted that she also has a lung mass on


her chest x-ray done in the ER.  It is a large mass about 9.3 x 6.4 causing


compression of the right bronchus.  Findings are favoring primary lung cancer.





Vitals/I&O


Vitals/I&O:





                                   Vital Signs








  Date Time  Temp Pulse Resp B/P (MAP) Pulse Ox O2 Delivery O2 Flow Rate FiO2


 


7/30/20 11:37  93 16 157/81 97 Simple Mask 5 


 


7/30/20 11:22 97.8       





 97.8       














                                    I & O   


 


 7/29/20 7/29/20 7/30/20





 15:00 23:00 07:00


 


Intake Total   240 ml


 


Balance   240 ml











Physical Exam


General:  Alert, Oriented X3, Cooperative


Heart:  Regular rate, Normal S1


Lungs:  Other (decrease bs )


Abdomen:  Normal bowel sounds


Extremities:  No cyanosis, Normal pulses


Skin:  No rashes





Labs


Labs:





Laboratory Tests








Test


 7/30/20


08:15


 


Prothrombin Time


 13.7 SEC


(11.7-14.0)


 


Prothromb Time International


Ratio 1.1 (0.8-1.1) 














Assessment and Plan


Assessmemt and Plan


Problems


Medical Problems:


(1) Cough with hemoptysis


Status: Acute  





(2) Lung mass


Status: Acute  





Assessment


Acute hypoxic respiratory failure secondary to underlying COPD, obesity


Hemoptysis secondary to suspected endobronchial lesion.


Primary lung malignancy - large suprahilar mass 9.3 cm in size causing 

compression of the right upper lobe bronchus 


Endobronchial lesion


Postobstructive consolidation in the right upper lobe


Right pleural effusion


Tobacco use


Hypertension





Plan


Trend labs


Home Meds


DVT prophylaxis


Awaiting results from bronchoscopy


IV antibiotics


Appreciate subspecialist input











Comment


Review of Relevant


I have reviewed the following items marcelino (where applicable) has been applied.


Medications:





Current Medications








 Medications


  (Trade)  Dose


 Ordered  Sig/Boo


 Route


 PRN Reason  Start Time


 Stop Time Status Last Admin


Dose Admin


 


 Ceftriaxone Sodium


  (Rocephin)  1 gm  Q24H


 IVP


   7/29/20 13:00


    7/29/20 15:41





 


 Aspirin


  (Ecotrin)  81 mg  DAILY


 PO


   7/29/20 13:30


    7/29/20 15:38





 


 Atorvastatin


 Calcium


  (Lipitor)  40 mg  HS


 PO


   7/29/20 21:00


    7/29/20 20:36





 


 Divalproex Sodium


  (Depakote)  1,000 mg  HS


 PO


   7/29/20 21:00


    7/29/20 20:36





 


 Furosemide


  (Lasix)  20 mg  DAILY


 PO


   7/29/20 13:30


    7/29/20 15:39





 


 Losartan Potassium


  (Cozaar)  25 mg  DAILY


 PO


   7/29/20 13:30


    7/29/20 15:39





 


 Diltiazem HCl


  (Cardizem 24hr


 Cd)  180 mg  BID


 PO


   7/29/20 21:00


    7/29/20 20:37





 


 Metformin HCl


  (Glucophage)  1,000 mg  BIDWMEALS


 PO


   7/29/20 17:00


    7/29/20 17:42





 


 Multivitamins


  (Thera M Plus)  1 tab  DAILY


 PO


   7/29/20 13:30


    7/29/20 15:39





 


 Nortriptyline HCl


  (Pamelor)  100 mg  QHS


 PO


   7/29/20 21:00


    7/29/20 20:36





 


 Baclofen


  (Lioresal)  20 mg  TID


 PO


   7/29/20 14:00


    7/29/20 20:36





 


 Albuterol Sulfate


  (Ventolin Neb


 Soln)  2.5 mg  PRN 1X  PRN


 NEB


 SHORTNESS OF BREATH  7/30/20 09:15


 7/31/20 09:14  7/30/20 10:34





 


 Lidocaine HCl


  (Lidocaine 2%


 Viscous)  100 ml  PRN 1X  PRN


 MM


 FOR PROCEDURE  7/30/20 09:15


 7/31/20 09:14  7/30/20 10:35





 


 Lidocaine HCl


  (Lidocaine 1%


 20ml Vial)  20 ml  PRN 1X  PRN


 INJ


 SEE COMMENTS  7/30/20 09:15


 7/31/20 09:14  7/30/20 10:35





 


 Epinephrine HCl


  (Adrenalin)  1 mg  PRN 1X  PRN


 INJ


 SEE COMMENTS  7/30/20 09:15


 7/31/20 09:14  7/30/20 11:35





 


 Lidocaine HCl


  (Lidocaine 4%


 Topical)  50 ml  PRN 1X  PRN


 MM


 SEE COMMENTS  7/30/20 09:15


 7/31/20 09:14  7/30/20 10:35





 


 Ringer's Solution  1,000 ml @ 


 100 mls/hr  Q10H


 IV


   7/30/20 10:30


    7/30/20 10:34














Justicifation of Admission Dx:


Justifications for Admission:


Justification of Admission Dx:  Yes











BENITO HARMON III DO           Jul 30, 2020 11:57

## 2020-07-30 NOTE — NUR
SW following. Spoke with RN and reviewed chart. Pt off the floor having a procedure/biopsy 
today. SW to continue following.

## 2020-07-30 NOTE — NUR
Transfer patient to N room 528. COVID (-), ok to transfer per Dr. Montero. Report given to 
CASSIE Yanez. All belongings with patient.

## 2020-07-30 NOTE — PDOC
PULMONARY PROGRESS NOTES


Subjective


hemoptysis persist


no soa


Vitals





Vital Signs








  Date Time  Temp Pulse Resp B/P (MAP) Pulse Ox O2 Delivery O2 Flow Rate FiO2


 


7/30/20 07:52      Room Air  


 


7/30/20 07:00 98.1 75 16 146/86 (106) 93   





 98.1       


 


7/30/20 03:00       2.0 








ROS:  No Chest Pain, No Increase Cough


General:  Alert, No acute distress


Lungs:  Other (decrease bs )


Cardiovascular:  S1


Abdomen:  Soft, Other (obese)


Neuro Exam:  Alert


Extremities:  No Edema


Skin:  Warm


Labs





Laboratory Tests








Test


 7/28/20


20:50 7/29/20


04:45 7/30/20


08:15


 


White Blood Count


 10.3 x10^3/uL


(4.0-11.0) 8.8 x10^3/uL


(4.0-11.0) 





 


Red Blood Count


 3.33 x10^6/uL


(3.50-5.40) 3.38 x10^6/uL


(3.50-5.40) 





 


Hemoglobin


 10.5 g/dL


(12.0-15.5) 10.5 g/dL


(12.0-15.5) 





 


Hematocrit


 29.9 %


(36.0-47.0) 30.5 %


(36.0-47.0) 





 


Mean Corpuscular Volume 90 fL ()  90 fL ()  


 


Mean Corpuscular Hemoglobin 32 pg (25-35)  31 pg (25-35)  


 


Mean Corpuscular Hemoglobin


Concent 35 g/dL


(31-37) 34 g/dL


(31-37) 





 


Red Cell Distribution Width


 13.9 %


(11.5-14.5) 13.8 %


(11.5-14.5) 





 


Platelet Count


 462 x10^3/uL


(140-400) 461 x10^3/uL


(140-400) 





 


Neutrophils (%) (Auto) 69 % (31-73)  68 % (31-73)  


 


Lymphocytes (%) (Auto) 21 % (24-48)  22 % (24-48)  


 


Monocytes (%) (Auto) 8 % (0-9)  8 % (0-9)  


 


Eosinophils (%) (Auto) 1 % (0-3)  1 % (0-3)  


 


Basophils (%) (Auto) 1 % (0-3)  1 % (0-3)  


 


Neutrophils # (Auto)


 7.1 x10^3/uL


(1.8-7.7) 6.0 x10^3/uL


(1.8-7.7) 





 


Lymphocytes # (Auto)


 2.2 x10^3/uL


(1.0-4.8) 2.0 x10^3/uL


(1.0-4.8) 





 


Monocytes # (Auto)


 0.8 x10^3/uL


(0.0-1.1) 0.7 x10^3/uL


(0.0-1.1) 





 


Eosinophils # (Auto)


 0.1 x10^3/uL


(0.0-0.7) 0.1 x10^3/uL


(0.0-0.7) 





 


Basophils # (Auto)


 0.1 x10^3/uL


(0.0-0.2) 0.0 x10^3/uL


(0.0-0.2) 





 


Prothrombin Time


 13.2 SEC


(11.7-14.0) 


 13.7 SEC


(11.7-14.0)


 


Prothromb Time International


Ratio 1.0 (0.8-1.1) 


 


 1.1 (0.8-1.1) 





 


Sodium Level


 139 mmol/L


(136-145) 139 mmol/L


(136-145) 





 


Potassium Level


 4.6 mmol/L


(3.5-5.1) 4.2 mmol/L


(3.5-5.1) 





 


Chloride Level


 103 mmol/L


() 103 mmol/L


() 





 


Carbon Dioxide Level


 28 mmol/L


(21-32) 29 mmol/L


(21-32) 





 


Anion Gap 8 (6-14)  7 (6-14)  


 


Blood Urea Nitrogen


 16 mg/dL


(7-20) 13 mg/dL


(7-20) 





 


Creatinine


 1.1 mg/dL


(0.6-1.0) 0.9 mg/dL


(0.6-1.0) 





 


Estimated GFR


(Cockcroft-Gault) 50.0 


 63.0 


 





 


BUN/Creatinine Ratio 15 (6-20)  14 (6-20)  


 


Glucose Level


 128 mg/dL


(70-99) 140 mg/dL


(70-99) 





 


Calcium Level


 9.3 mg/dL


(8.5-10.1) 9.1 mg/dL


(8.5-10.1) 





 


Magnesium Level


 1.7 mg/dL


(1.8-2.4) 


 





 


Total Bilirubin


 0.1 mg/dL


(0.2-1.0) 0.2 mg/dL


(0.2-1.0) 





 


Aspartate Amino Transf


(AST/SGOT) 16 U/L (15-37) 


 16 U/L (15-37) 


 





 


Alanine Aminotransferase


(ALT/SGPT) 25 U/L (14-59) 


 22 U/L (14-59) 


 





 


Alkaline Phosphatase


 100 U/L


() 95 U/L


() 





 


Troponin I Quantitative


 < 0.017 ng/mL


(0.000-0.055) 


 





 


NT-Pro-B-Type Natriuretic


Peptide 216 pg/mL


(0-124) 


 





 


Total Protein


 7.1 g/dL


(6.4-8.2) 6.9 g/dL


(6.4-8.2) 





 


Albumin


 2.7 g/dL


(3.4-5.0) 2.6 g/dL


(3.4-5.0) 





 


Albumin/Globulin Ratio 0.6 (1.0-1.7)  0.6 (1.0-1.7)  


 


Coronavirus (PCR)


 Not detected


(Not Detected) 


 











Laboratory Tests








Test


 7/30/20


08:15


 


Prothrombin Time


 13.7 SEC


(11.7-14.0)


 


Prothromb Time International


Ratio 1.1 (0.8-1.1) 











Medications





Active Scripts








 Medications  Dose


 Route/Sig


 Max Daily Dose Days Date Category Dose


Instructions


 


 Augmentin 875-125


 Tablet


  (Amoxicillin/Potassium


 Clav) 1 Each


 Tablet  1 Tab


 PO BID


  4 7/29/20 Reported 


 


 Baclofen 20 Mg


 Tablet  20 Mg


 PO TID


   7/29/20 Reported 


 


 Metronidazole 500


 Mg Tablet  1 Tab


 PO BID


  7 7/29/20 Reported 


 


 Aspir-Low


  (Aspirin) 81 Mg


 Tablet.dr  81 Mg


 PO DAILY


   4/11/14 Reported  LAST DOSE GIVEN:


 DATE: 4/29


 TIME: 9 am


 NEXT DOSE DUE:


 DATE: 4/30


 TIME: 9 am


 


 Nitrostat


  (Nitroglycerin)


 0.4 Mg Tab.subl  0.4 Mg


 SL  PRN


   4/2/14 Reported  Take as directed as needed for chest pain.


 


 Losartan


 Potassium **


  (Losartan


 Potassium) 25 Mg


 Tablet  25 Mg


 PO DAILY


   4/2/14 Reported  LAST DOSE GIVEN:


 DATE: 4/27


 TIME: 9 am


 NEXT DOSE DUE:


 DATE: 4/30


 TIME: 9 am


 


 Atorvastatin


 Calcium 40 Mg


 Tablet  40 Mg


 PO HS


   4/2/14 Reported  LAST DOSE GIVEN:


 DATE: 4/28


 TIME: 9 pm


 NEXT DOSE DUE:


 DATE: 4/29


 TIME: 9 pm


 


 Nortriptyline Hcl


 50 Mg Capsule  100 Mg


 PO HS


   4/2/14 Reported  LAST DOSE GIVEN:


 DATE: 4/28


 TIME: 9 pm


 NEXT DOSE DUE:


 DATE: 4/29


 TIME: 9 pm


 


 Depakote


  (Divalproex


 Sodium) 500 Mg


 Tablet.dr  1,000 Mg


 PO HS


   4/2/14 Reported  LAST DOSE GIVEN:


 DATE: 4/28


 TIME: 9 pm


 NEXT DOSE DUE:


 DATE: 4/29


 TIME: 9 pm


 


 Metformin Hcl


 1,000 Mg Tablet  1,000 Mg


 PO BID


   4/2/14 Reported  LAST DOSE GIVEN:


 DATE: 4/29


 TIME: 8 am


 NEXT DOSE DUE:


 DATE: 4/29


 TIME: 5 pm


 


 Diltiazem Hcl


 Tablet (Diltiazem


 Hcl) 120 Mg Tablet  180 Mg


 PO BID


   4/2/14 Reported  LAST DOSE GIVEN:


 DATE: 4/29


 TIME: 9 am


 NEXT DOSE DUE:


 DATE: 4/29


 TIME: 9 am


 


 Furosemide 20 Mg


 Tablet  20 Mg


 PO DAILY


   4/2/14 Reported  LAST DOSE GIVEN:


 DATE: 4/28


 TIME: 9 am


 NEXT DOSE DUE:


 DATE: 4/30


 TIME: 9 am


 


 Multiple Vitamin


  (Multivitamin


 With Minerals) 1


 Each Tablet  1 Each


 PO DAILY


   4/2/14 Reported  LAST DOSE GIVEN:


 DATE: 4/29


 TIME: 9 am


 NEXT DOSE DUE:


 DATE: 4/30


 TIME: 9 am











Impression


.


1.  Acute hypoxic respiratory failure secondary to underlying chronic


obstructive pulmonary disease, obesity and highly suspected primary lung


malignancy with endobronchial lesion.


2.  Hemoptysis secondary to suspected endobronchial lesion.


3.  Abnormal CT chest with a large suprahilar mass 9.3 cm in size causing


compression of the right upper lobe bronchus and also endobronchial lesion is


suspected.  She has a postobstructive consolidation in the right upper lobe,


which could be combination of blood and pneumonia.





Plan


.





1.  Discussed with the patient and RN.  I explained to her the CT chest


findings.  She would be a candidate for bronchoscopy.  COVID testing is 

negative.


.  She understands the risk of procedure..  She also understands that post-

biopsy, there could be bleeding, which


in a small percentage of cases, may result in respiratory failure requiring


mechanical ventilation.  She agrees to proceed with the procedure.


2.   CT abdomen and pelvis with no distant metastasis.


3.  Continue present oxygen.


4.   empiric antibiotic.


5.  Weight loss is advised.


6.  Medical-Oncology and Radiation-Oncology rec  Based on the CT


chest, she is not a surgical candidate.


7.  PFTs as an outpatient.


8.  Discussed with RN and we will follow along with you.











BUBBA WRAY MD                 Jul 30, 2020 09:38

## 2020-07-31 VITALS — DIASTOLIC BLOOD PRESSURE: 85 MMHG | SYSTOLIC BLOOD PRESSURE: 124 MMHG

## 2020-07-31 VITALS — SYSTOLIC BLOOD PRESSURE: 125 MMHG | DIASTOLIC BLOOD PRESSURE: 79 MMHG

## 2020-07-31 VITALS — SYSTOLIC BLOOD PRESSURE: 135 MMHG | DIASTOLIC BLOOD PRESSURE: 85 MMHG

## 2020-07-31 VITALS — SYSTOLIC BLOOD PRESSURE: 143 MMHG | DIASTOLIC BLOOD PRESSURE: 75 MMHG

## 2020-07-31 LAB — PROTHROMBIN TIME: 14.3 SEC (ref 11.7–14)

## 2020-07-31 RX ADMIN — BACLOFEN SCH MG: 10 TABLET ORAL at 16:06

## 2020-07-31 RX ADMIN — LOSARTAN POTASSIUM SCH MG: 25 TABLET, FILM COATED ORAL at 09:20

## 2020-07-31 RX ADMIN — BACLOFEN SCH MG: 10 TABLET ORAL at 09:15

## 2020-07-31 RX ADMIN — ASPIRIN SCH MG: 81 TABLET, COATED ORAL at 09:15

## 2020-07-31 RX ADMIN — FUROSEMIDE SCH MG: 20 TABLET ORAL at 09:15

## 2020-07-31 RX ADMIN — SODIUM CHLORIDE, SODIUM LACTATE, POTASSIUM CHLORIDE, AND CALCIUM CHLORIDE SCH MLS/HR: .6; .31; .03; .02 INJECTION, SOLUTION INTRAVENOUS at 16:30

## 2020-07-31 RX ADMIN — SODIUM CHLORIDE, SODIUM LACTATE, POTASSIUM CHLORIDE, AND CALCIUM CHLORIDE SCH MLS/HR: .6; .31; .03; .02 INJECTION, SOLUTION INTRAVENOUS at 04:52

## 2020-07-31 RX ADMIN — CEFTRIAXONE SCH GM: 1 INJECTION, POWDER, FOR SOLUTION INTRAMUSCULAR; INTRAVENOUS at 16:02

## 2020-07-31 RX ADMIN — BACITRACIN SCH MLS/HR: 5000 INJECTION, POWDER, FOR SOLUTION INTRAMUSCULAR at 12:30

## 2020-07-31 RX ADMIN — MULTIPLE VITAMINS W/ MINERALS TAB SCH TAB: TAB at 09:15

## 2020-07-31 NOTE — PATHOLOGY
Note

[facility] Accession Number: [id number]

   TESTS               RESULT  FLAG  UNITS    REF RANGE  LAB

------------------------------------------------------------

   Clinician Provided Cytology Information

   No. of containers..01 Slide

Source:                                                   01

   BRONCH BRSH RUL RML

DIAGNOSIS:                                                02

   BRONCH BRSH RUL RML

   NEGATIVE FOR MALIGNANT CELLS.

   REACTIVE BRONCHIAL CELLS AND FEW PULMONARY MACROPHAGES PRESENT.

Signed out by:                                            02

   Flavio Miller MD, Pathologist

   NPI- [id number]

Performed by:                                             01

   Madhav Boss, Cytotechnologist ([facility])

Gross description:                                        01

   2 FX

   /LCS  07/30/2020  [address] [place] [address]



------------------------------------------------------------

    FLAG LEGEND:

    L-Low Normal,H-High Normal,LL-Alert Low,HH-Alert High

    <-Panic Low,>-Panic High,A-Abnormal,AA-Critical Abnormal

------------------------------------------------------------



Performed at:

[address]

   [place]  [address]

   Daniel Ybarra MD, Phone: [phone]

02 [facility]

   [address] [place]  [address]

   Flavio Miller MD, Phone: [phone]

Specimen Comment: A courtesy copy of this report has been sent to [phone]

Specimen Comment: [id number], [phone]

Specimen Comment: Report sent to ,DR RAMÍREZ / DR TELLEZ

***Performed at:  [address] [place] [address] [address], [place], [place]  [id number]

   MD Daniel Ybarra MD Phone:  [phone]

## 2020-07-31 NOTE — PDOC
[employer] PROGRESS NOTE


Chief Complaint


Chief Complaint


Acute hypoxic respiratory failure secondary to underlying COPD, obesity 


Hemoptysis secondary to suspected endobronchial lesion.


Primary lung malignancy - large suprahilar mass 9.3 cm in size causing 

compression of the right upper lobe bronchus 


Endobronchial lesion


Postobstructive consolidation in the right upper lobe,


Right pleural effusion


Tobacco use


Hypertension





History of Present Illness


History of Present Illness


7/31/2020


Patient is seen and examined, resting comfortably


Charts reviewed


Discussed with RN


Patient is awaiting CT guided broncoscopy


Patient states that hemoptysis has improved





7/30/2020


Patient was seen and examined


Charts reviewed


Discussed with RN





HISTORY OF PRESENT ILLNESS:  The patient is a pleasant 65-year-old female who


states she quit smoking 3 weeks ago, but then she had a cigarette on the way to


the hospital.  She came to the hospital because she has been coughing up blood,


it is a fair amount of blood, rated at 6/10.  She has associated anxiety.  I


discussed the case with ER physician.  We are going to admit the patient and


consult Pulmonary Medicine.  It should be noted that she also has a lung mass on


her chest x-ray done in the ER.  It is a large mass about 9.3 x 6.4 causing


compression of the right bronchus.  Findings are favoring primary lung cancer.





Vitals/I&O


Vitals/I&O:





                                   Vital Signs








  Date Time  Temp Pulse Resp B/P (MAP) Pulse Ox O2 Delivery O2 Flow Rate FiO2


 


7/31/20 11:09 98.2 91 18 124/85 (98) 92 Room Air  





 98.2       


 


7/30/20 11:52       5 














                                    I & O   


 


 7/30/20 7/30/20 7/31/20





 15:00 23:00 07:00


 


Intake Total 400 ml 120 ml 


 


Balance 400 ml 120 ml 











Physical Exam


General:  Alert, Oriented X3, Cooperative, No acute distress


Heart:  Regular rate, Normal S1


Lungs:  Other (decrease bs )


Abdomen:  Normal bowel sounds


Extremities:  No cyanosis, Normal pulses


Skin:  No rashes





Assessment and Plan


Assessmemt and Plan


Problems


Medical Problems:


(1) Cough with hemoptysis


Status: Acute  





(2) Lung mass


Status: Acute  





Assessment


Acute hypoxic respiratory failure secondary to underlying COPD, obesity 


Hemoptysis secondary to suspected endobronchial lesion.


Primary lung malignancy - large suprahilar mass 9.3 cm in size causing 

compression of the right upper lobe bronchus 


Endobronchial lesion


Postobstructive consolidation in the right upper lobe,


Right pleural effusion


Tobacco use


Hypertension





Plan


Cardiac monitoring


Trend labs


Home Meds


DVT prophylaxis


Awaiting CT guided bronchoscopy


IV antibiotics


Appreciate subspecialist input








Comment


Review of Relevant


I have reviewed the following items marcelino (where applicable) has been applied.





Justicifation of Admission Dx:


Justifications for Admission:


Justification of Admission Dx:  Yes











BENITO HARMON III DO           Jul 31, 2020 11:40

## 2020-07-31 NOTE — RAD
Limited CT imaging of the chest 7/31/2020



INDICATION: Possible lung biopsy 





Discussion: Limited CT imaging of the chest was performed for possible CT

guided lung biopsy. No new abnormalities are identified from prior exam. Lung

biopsy was not performed 



Impression: CT guided lung biopsy not performed.

 







PQRS Compliance Statement:



One or more of the following individualized dose reduction techniques were

utilized for this examination:  

1. Automated exposure control  

2. Adjustment of the mA and/or kV according to patient size  

3. Use of iterative reconstruction technique

## 2020-07-31 NOTE — NUR
SW following. Coordinated care with RN and CM. Reviewed chart. Spoke with pt who stated no 
concerns about returning home. Pt was going to have a biopsy of her lung today but will 
follow up out-patient. Pt to discharge home today, 7/31/2020. Pt on room air and oral 
medications. No further SW needs at this time.

## 2020-07-31 NOTE — NUR
Discharge Note:

Patient was discharged home with self care. Patients IV was discontinued without any 
complications per CNA. Patient was given discharge summary/instructions, follow-ups, and 
educational material. Patients prescription was called into patients preferred pharmacy. 
Patient did not have any further questions or concerns. Patient was taken down to the ER 
entrance via wheelchair with all personal belongings, accompanied by MERCEDES Phillips, where 
patients friend was waiting for her to take her home.

## 2020-07-31 NOTE — PDOC
PULMONARY PROGRESS NOTES


Subjective


hemoptysis improving


no soa


Vitals





Vital Signs








  Date Time  Temp Pulse Resp B/P (MAP) Pulse Ox O2 Delivery O2 Flow Rate FiO2


 


7/31/20 09:20  87  143/75    


 


7/31/20 07:15 97.8  18  91 Room Air  





 97.8       


 


7/30/20 11:52       5 








ROS:  No Chest Pain, No Increase Cough


General:  Alert, No acute distress


Lungs:  Other (decrease bs )


Cardiovascular:  S1


Abdomen:  Soft, Other (obese)


Neuro Exam:  Alert


Extremities:  No Edema


Skin:  Warm


Labs





Laboratory Tests








Test


 7/30/20


08:15


 


Prothrombin Time


 13.7 SEC


(11.7-14.0)


 


Prothromb Time International


Ratio 1.1 (0.8-1.1) 











Medications





Active Scripts








 Medications  Dose


 Route/Sig


 Max Daily Dose Days Date Category Dose


Instructions


 


 Augmentin 875-125


 Tablet


  (Amoxicillin/Potassium


 Clav) 1 Each


 Tablet  1 Tab


 PO BID


  4 7/29/20 Reported 


 


 Baclofen 20 Mg


 Tablet  20 Mg


 PO TID


   7/29/20 Reported 


 


 Metronidazole 500


 Mg Tablet  1 Tab


 PO BID


  7 7/29/20 Reported 


 


 Aspir-Low


  (Aspirin) 81 Mg


 Tablet.dr  81 Mg


 PO DAILY


   4/11/14 Reported  LAST DOSE GIVEN:


 DATE: 4/29


 TIME: 9 am


 NEXT DOSE DUE:


 DATE: 4/30


 TIME: 9 am


 


 Nitrostat


  (Nitroglycerin)


 0.4 Mg Tab.subl  0.4 Mg


 SL  PRN


   4/2/14 Reported  Take as directed as needed for chest pain.


 


 Losartan


 Potassium **


  (Losartan


 Potassium) 25 Mg


 Tablet  25 Mg


 PO DAILY


   4/2/14 Reported  LAST DOSE GIVEN:


 DATE: 4/27


 TIME: 9 am


 NEXT DOSE DUE:


 DATE: 4/30


 TIME: 9 am


 


 Atorvastatin


 Calcium 40 Mg


 Tablet  40 Mg


 PO HS


   4/2/14 Reported  LAST DOSE GIVEN:


 DATE: 4/28


 TIME: 9 pm


 NEXT DOSE DUE:


 DATE: 4/29


 TIME: 9 pm


 


 Nortriptyline Hcl


 50 Mg Capsule  100 Mg


 PO HS


   4/2/14 Reported  LAST DOSE GIVEN:


 DATE: 4/28


 TIME: 9 pm


 NEXT DOSE DUE:


 DATE: 4/29


 TIME: 9 pm


 


 Depakote


  (Divalproex


 Sodium) 500 Mg


 Tablet.dr  1,000 Mg


 PO HS


   4/2/14 Reported  LAST DOSE GIVEN:


 DATE: 4/28


 TIME: 9 pm


 NEXT DOSE DUE:


 DATE: 4/29


 TIME: 9 pm


 


 Metformin Hcl


 1,000 Mg Tablet  1,000 Mg


 PO BID


   4/2/14 Reported  LAST DOSE GIVEN:


 DATE: 4/29


 TIME: 8 am


 NEXT DOSE DUE:


 DATE: 4/29


 TIME: 5 pm


 


 Diltiazem Hcl


 Tablet (Diltiazem


 Hcl) 120 Mg Tablet  180 Mg


 PO BID


   4/2/14 Reported  LAST DOSE GIVEN:


 DATE: 4/29


 TIME: 9 am


 NEXT DOSE DUE:


 DATE: 4/29


 TIME: 9 am


 


 Furosemide 20 Mg


 Tablet  20 Mg


 PO DAILY


   4/2/14 Reported  LAST DOSE GIVEN:


 DATE: 4/28


 TIME: 9 am


 NEXT DOSE DUE:


 DATE: 4/30


 TIME: 9 am


 


 Multiple Vitamin


  (Multivitamin


 With Minerals) 1


 Each Tablet  1 Each


 PO DAILY


   4/2/14 Reported  LAST DOSE GIVEN:


 DATE: 4/29


 TIME: 9 am


 NEXT DOSE DUE:


 DATE: 4/30


 TIME: 9 am











Impression


.


1.  Acute hypoxic respiratory failure secondary to underlying chronic


obstructive pulmonary disease, obesity and highly suspected primary lung


malignancy with endobronchial lesion.


2.  Hemoptysis secondary to mucosal endobronchial lesion.


3.  Abnormal CT chest with a large suprahilar mass 9.3 cm in size causing


compression of the right upper lobe bronchus and also endobronchial lesion is


suspected.  She has a postobstructive consolidation in the right upper lobe,


which could be combination of blood and pneumonia.





Plan


.





1.  s/p bronchoscopy. BX from RML opening mucosa non diagnostic. Mostly 

extrinsic compression by large tumor. d/w IR and patient. Will consider ct 

guided bx. she understands risk of PTX. If no dx, then will need Media

stinoscopy.


2.   CT abdomen and pelvis with no distant metastasis.


3.  Continue present oxygen.


4.   empiric antibiotic.


5.  Weight loss is advised.


6.  Medical-Oncology and Radiation-Oncology rec  Based on the CT


chest, she is not a surgical candidate.


7.  PFTs as an outpatient.


8.  Discussed with RN and we will follow along with you.


9.  IR consulted





   Addend: d/w IR. Mostly lung consolidation . Will not be able to bx.


                would recommend discharging patient and do op Mediastinoscopy . 

Would rec referring patient to thoracic surgery at [facility].














BUBBA WRAY MD                 Jul 31, 2020 10:23

## 2020-07-31 NOTE — PATHOLOGY
[facility] Accession Number: [id number]

.                                                                01

Material submitted:                                        .

lung - BBX RML. Modifiers: right, mid

.                                                                01

Clinical history:                                          .

Lung mass, hemoptysis

.                                                                02

**********************************************************************

Diagnosis:

Bronchial biopsies, right middle lobe:

- Slight chronic inflammation.

(JPM:derick; 07/31/2020)

S  07/31/2020  0910 Local

**********************************************************************

.                                                                02

Comment:

Sections of the bronchial biopsy reveal several segments of bronchial

mucosa showing mild congestion, edema, and slight chronic inflammation.

There is no evidence of malignancy.

(JPM:derick; 07/31/2020)

.                                                                02

Electronically signed:                                     .

Flavio Miller MD, Pathologist

NPI- [id number]

.                                                                01

Gross description:                                         .

The specimen is received in formalin, labeled "Minerva Melchor, [facility] bronch

biopsy".  Received are three segments of pale tan soft tissue ranging in

size from 0.1 to zero point to cm in maximum dimensions.  The specimen is

submitted entirely in cassette A1.

([facility]; 7/30/2020)

QA/QA  07/30/2020  1747 Local

.                                                                02

Pathologist provided ICD-10:

J42

.                                                                02

CPT                                                        .

822405

Specimen Comment: A courtesy copy of this report has been sent to [phone]

Specimen Comment: [id number], [phone], [phone]

Specimen Comment: Report sent to ,DR RAÍMREZ,DR OG / DR TELLEZ

***Performed at:  01

   [facility]

   [address], [place], [place]  [id number]

   MD Daniel Ybarra MD Phone:  [phone]

***Performed at:  02

   [facility]

   [address] [place], [place]  [id number]

   MD Flavio Miller MD Phone:  [phone]

## 2020-07-31 NOTE — DS
DATE OF DISCHARGE:  07/31/2020



ADMISSION DIAGNOSES:  Hemoptysis and 9 cm new lung mass, history of severe

tobacco abuse.



DISCHARGE DIAGNOSES:  Lung mass (we tried to biopsy it a couple of times, but it

is just in the wrong position.  She is going to follow up at another facility

where they can get access to this mass.



HOSPITAL COURSE:  The patient is a pleasant elderly female who smoked heavily

until just a few weeks ago.  She actually smoked her last cigarette on the way

to the hospital.  She has been coughing up blood.  We admitted her.  We did some

imaging.  She does have a 9 cm mass.  We have tried to biopsy with bronchoscopy,

but that was not successful.  Then we consulted Interventional Radiology, but

they were not able to biopsy it either.  Today, I did see her and examined her,

she is doing quite well.  Heart tones were normal.  Lungs were surprisingly

clear.  We are going to discharge if she is going to follow up at another

facility, perhaps [facility] for a biopsy of the mass.



DISPOSITION:  Home.



ACTIVITY:  As tolerated.



DIET:  Low sodium.



MEDICATIONS:  Please see the MRAD.



TOTAL TIME:  32 minutes.

 



______________________________

BENITO HARMON DO DR:  SHELBY/megan  JOB#:  [id number] / [id number]

DD:  07/31/2020 20:37  DT:  07/31/2020 20:47

## 2020-07-31 NOTE — PATHOLOGY
Note

[facility] Accession Number: [id number]

   TESTS               RESULT  FLAG  UNITS    REF RANGE  LAB

------------------------------------------------------------

   Clinician Provided Cytology Information

   No. of containers..01 Other (Miscellaneous)

Source:                                                   01

   BRUSH TIP

DIAGNOSIS:                                                02

   BRUSH TIP

   NEGATIVE FOR MALIGNANT CELLS.

   REACTIVE BRONCHIAL CELLS ARE PRESENT.

   PULMONARY MACROPHAGES (DUST CELLS) ARE PRESENT.

Signed out by:                                            02

   Flavio Miller MD, Pathologist

   NPI- [id number]

Performed by:                                             01

   Madhav Boss, Cytotechnologist ([facility])

Gross description:                                        01

   1 RAUL

   /DIALLO  07/30/2020  [address] [place] [address]



------------------------------------------------------------

    FLAG LEGEND:

    L-Low Normal,H-High Normal,LL-Alert Low,HH-Alert High

    <-Panic Low,>-Panic High,A-Abnormal,AA-Critical Abnormal

------------------------------------------------------------



Performed at:

01 [facility]

   [address]

   [place]  [address]

   Daniel Ybarra MD, Phone: [phone]

02 [facility]

   [address] [place]  [address]

   Flavio Miller MD, Phone: [phone]

Specimen Comment: A courtesy copy of this report has been sent to [phone]

Specimen Comment: [id number], [phone]

Specimen Comment: Report sent to ,DR RAMÍREZ / DR TELLEZ

Specimen Comment: A duplicate report has been generated due to demographic updates.

***Performed at:  [address] [place] [address] [place]

   [address], [place], [place]  [id number]

   MD Daniel Ybarra MD Phone:  [phone]